# Patient Record
Sex: MALE | Race: BLACK OR AFRICAN AMERICAN | Employment: UNEMPLOYED | URBAN - METROPOLITAN AREA
[De-identification: names, ages, dates, MRNs, and addresses within clinical notes are randomized per-mention and may not be internally consistent; named-entity substitution may affect disease eponyms.]

---

## 2019-09-05 ENCOUNTER — APPOINTMENT (OUTPATIENT)
Dept: ULTRASOUND IMAGING | Age: 24
DRG: 282 | End: 2019-09-05
Payer: COMMERCIAL

## 2019-09-05 ENCOUNTER — APPOINTMENT (OUTPATIENT)
Dept: GENERAL RADIOLOGY | Age: 24
DRG: 282 | End: 2019-09-05
Payer: COMMERCIAL

## 2019-09-05 ENCOUNTER — HOSPITAL ENCOUNTER (INPATIENT)
Age: 24
LOS: 2 days | Discharge: HOME | DRG: 282 | End: 2019-09-07
Attending: EMERGENCY MEDICINE | Admitting: INTERNAL MEDICINE
Payer: COMMERCIAL

## 2019-09-05 DIAGNOSIS — R10.13 EPIGASTRIC PAIN: ICD-10-CM

## 2019-09-05 DIAGNOSIS — K85.90 ACUTE PANCREATITIS, UNSPECIFIED COMPLICATION STATUS, UNSPECIFIED PANCREATITIS TYPE: Primary | ICD-10-CM

## 2019-09-05 PROBLEM — Z72.51 HIGH RISK SEXUAL BEHAVIOR: Status: ACTIVE | Noted: 2019-09-05

## 2019-09-05 PROBLEM — F10.10 ALCOHOL ABUSE: Status: ACTIVE | Noted: 2019-09-05

## 2019-09-05 PROBLEM — K85.20 ALCOHOL-INDUCED ACUTE PANCREATITIS WITHOUT INFECTION OR NECROSIS: Status: ACTIVE | Noted: 2019-09-05

## 2019-09-05 LAB
ALBUMIN SERPL-MCNC: 5 G/DL (ref 3.4–5)
ALP BLD-CCNC: 75 U/L (ref 40–129)
ALT SERPL-CCNC: 21 U/L (ref 10–40)
ANION GAP SERPL CALCULATED.3IONS-SCNC: 21 MMOL/L (ref 3–16)
AST SERPL-CCNC: 35 U/L (ref 15–37)
BASOPHILS ABSOLUTE: 0 K/UL (ref 0–0.2)
BASOPHILS RELATIVE PERCENT: 0.4 %
BILIRUB SERPL-MCNC: 1.2 MG/DL (ref 0–1)
BILIRUBIN DIRECT: 0.3 MG/DL (ref 0–0.3)
BILIRUBIN, INDIRECT: 0.9 MG/DL (ref 0–1)
BUN BLDV-MCNC: 9 MG/DL (ref 7–20)
CALCIUM SERPL-MCNC: 9.9 MG/DL (ref 8.3–10.6)
CHLORIDE BLD-SCNC: 97 MMOL/L (ref 99–110)
CO2: 21 MMOL/L (ref 21–32)
CREAT SERPL-MCNC: 0.8 MG/DL (ref 0.9–1.3)
EKG ATRIAL RATE: 59 BPM
EKG DIAGNOSIS: NORMAL
EKG P-R INTERVAL: 100 MS
EKG Q-T INTERVAL: 458 MS
EKG QRS DURATION: 96 MS
EKG QTC CALCULATION (BAZETT): 453 MS
EKG R AXIS: 75 DEGREES
EKG T AXIS: 39 DEGREES
EKG VENTRICULAR RATE: 59 BPM
EOSINOPHILS ABSOLUTE: 0 K/UL (ref 0–0.6)
EOSINOPHILS RELATIVE PERCENT: 0 %
GFR AFRICAN AMERICAN: >60
GFR NON-AFRICAN AMERICAN: >60
GLUCOSE BLD-MCNC: 132 MG/DL (ref 70–99)
HCT VFR BLD CALC: 42.8 % (ref 40.5–52.5)
HEMOGLOBIN: 14.5 G/DL (ref 13.5–17.5)
LIPASE: 361 U/L (ref 13–60)
LYMPHOCYTES ABSOLUTE: 1.3 K/UL (ref 1–5.1)
LYMPHOCYTES RELATIVE PERCENT: 13 %
MCH RBC QN AUTO: 32.5 PG (ref 26–34)
MCHC RBC AUTO-ENTMCNC: 33.9 G/DL (ref 31–36)
MCV RBC AUTO: 96 FL (ref 80–100)
MONOCYTES ABSOLUTE: 0.7 K/UL (ref 0–1.3)
MONOCYTES RELATIVE PERCENT: 7.5 %
NEUTROPHILS ABSOLUTE: 7.8 K/UL (ref 1.7–7.7)
NEUTROPHILS RELATIVE PERCENT: 79.1 %
PDW BLD-RTO: 14 % (ref 12.4–15.4)
PLATELET # BLD: 350 K/UL (ref 135–450)
PMV BLD AUTO: 9.1 FL (ref 5–10.5)
POTASSIUM REFLEX MAGNESIUM: 3.7 MMOL/L (ref 3.5–5.1)
RBC # BLD: 4.46 M/UL (ref 4.2–5.9)
SODIUM BLD-SCNC: 139 MMOL/L (ref 136–145)
TOTAL PROTEIN: 7.6 G/DL (ref 6.4–8.2)
TRIGL SERPL-MCNC: 92 MG/DL (ref 0–150)
TROPONIN: <0.01 NG/ML
TROPONIN: <0.01 NG/ML
WBC # BLD: 9.8 K/UL (ref 4–11)

## 2019-09-05 PROCEDURE — 2580000003 HC RX 258: Performed by: EMERGENCY MEDICINE

## 2019-09-05 PROCEDURE — 80048 BASIC METABOLIC PNL TOTAL CA: CPT

## 2019-09-05 PROCEDURE — 1200000000 HC SEMI PRIVATE

## 2019-09-05 PROCEDURE — 93005 ELECTROCARDIOGRAM TRACING: CPT | Performed by: EMERGENCY MEDICINE

## 2019-09-05 PROCEDURE — 6360000002 HC RX W HCPCS: Performed by: EMERGENCY MEDICINE

## 2019-09-05 PROCEDURE — 6360000002 HC RX W HCPCS: Performed by: INTERNAL MEDICINE

## 2019-09-05 PROCEDURE — 96361 HYDRATE IV INFUSION ADD-ON: CPT

## 2019-09-05 PROCEDURE — 6370000000 HC RX 637 (ALT 250 FOR IP): Performed by: EMERGENCY MEDICINE

## 2019-09-05 PROCEDURE — 2500000003 HC RX 250 WO HCPCS: Performed by: INTERNAL MEDICINE

## 2019-09-05 PROCEDURE — 83690 ASSAY OF LIPASE: CPT

## 2019-09-05 PROCEDURE — 84478 ASSAY OF TRIGLYCERIDES: CPT

## 2019-09-05 PROCEDURE — 71046 X-RAY EXAM CHEST 2 VIEWS: CPT

## 2019-09-05 PROCEDURE — 84484 ASSAY OF TROPONIN QUANT: CPT

## 2019-09-05 PROCEDURE — 85025 COMPLETE CBC W/AUTO DIFF WBC: CPT

## 2019-09-05 PROCEDURE — 80076 HEPATIC FUNCTION PANEL: CPT

## 2019-09-05 PROCEDURE — 96374 THER/PROPH/DIAG INJ IV PUSH: CPT

## 2019-09-05 PROCEDURE — 96375 TX/PRO/DX INJ NEW DRUG ADDON: CPT

## 2019-09-05 PROCEDURE — 76705 ECHO EXAM OF ABDOMEN: CPT

## 2019-09-05 PROCEDURE — 99285 EMERGENCY DEPT VISIT HI MDM: CPT

## 2019-09-05 PROCEDURE — 2580000003 HC RX 258: Performed by: INTERNAL MEDICINE

## 2019-09-05 RX ORDER — KETOROLAC TROMETHAMINE 30 MG/ML
15 INJECTION, SOLUTION INTRAMUSCULAR; INTRAVENOUS ONCE
Status: COMPLETED | OUTPATIENT
Start: 2019-09-05 | End: 2019-09-05

## 2019-09-05 RX ORDER — METOCLOPRAMIDE HYDROCHLORIDE 5 MG/ML
10 INJECTION INTRAMUSCULAR; INTRAVENOUS ONCE
Status: COMPLETED | OUTPATIENT
Start: 2019-09-05 | End: 2019-09-05

## 2019-09-05 RX ORDER — SODIUM CHLORIDE 0.9 % (FLUSH) 0.9 %
10 SYRINGE (ML) INJECTION EVERY 12 HOURS SCHEDULED
Status: DISCONTINUED | OUTPATIENT
Start: 2019-09-05 | End: 2019-09-05 | Stop reason: SDUPTHER

## 2019-09-05 RX ORDER — HYDROCODONE BITARTRATE AND ACETAMINOPHEN 5; 325 MG/1; MG/1
1 TABLET ORAL ONCE
Status: COMPLETED | OUTPATIENT
Start: 2019-09-05 | End: 2019-09-05

## 2019-09-05 RX ORDER — SODIUM CHLORIDE, SODIUM LACTATE, POTASSIUM CHLORIDE, CALCIUM CHLORIDE 600; 310; 30; 20 MG/100ML; MG/100ML; MG/100ML; MG/100ML
1000 INJECTION, SOLUTION INTRAVENOUS ONCE
Status: COMPLETED | OUTPATIENT
Start: 2019-09-05 | End: 2019-09-05

## 2019-09-05 RX ORDER — LORAZEPAM 1 MG/1
3 TABLET ORAL
Status: DISCONTINUED | OUTPATIENT
Start: 2019-09-05 | End: 2019-09-07 | Stop reason: HOSPADM

## 2019-09-05 RX ORDER — SODIUM CHLORIDE 0.9 % (FLUSH) 0.9 %
10 SYRINGE (ML) INJECTION PRN
Status: DISCONTINUED | OUTPATIENT
Start: 2019-09-05 | End: 2019-09-07 | Stop reason: HOSPADM

## 2019-09-05 RX ORDER — MORPHINE SULFATE 2 MG/ML
4 INJECTION, SOLUTION INTRAMUSCULAR; INTRAVENOUS
Status: DISCONTINUED | OUTPATIENT
Start: 2019-09-05 | End: 2019-09-07 | Stop reason: HOSPADM

## 2019-09-05 RX ORDER — SODIUM CHLORIDE 0.9 % (FLUSH) 0.9 %
10 SYRINGE (ML) INJECTION EVERY 12 HOURS SCHEDULED
Status: DISCONTINUED | OUTPATIENT
Start: 2019-09-05 | End: 2019-09-07 | Stop reason: HOSPADM

## 2019-09-05 RX ORDER — LORAZEPAM 2 MG/ML
1 INJECTION INTRAMUSCULAR
Status: DISCONTINUED | OUTPATIENT
Start: 2019-09-05 | End: 2019-09-07 | Stop reason: HOSPADM

## 2019-09-05 RX ORDER — LORAZEPAM 2 MG/ML
3 INJECTION INTRAMUSCULAR
Status: DISCONTINUED | OUTPATIENT
Start: 2019-09-05 | End: 2019-09-07 | Stop reason: HOSPADM

## 2019-09-05 RX ORDER — ONDANSETRON 2 MG/ML
4 INJECTION INTRAMUSCULAR; INTRAVENOUS EVERY 6 HOURS PRN
Status: DISCONTINUED | OUTPATIENT
Start: 2019-09-05 | End: 2019-09-07 | Stop reason: HOSPADM

## 2019-09-05 RX ORDER — LORAZEPAM 2 MG/ML
4 INJECTION INTRAMUSCULAR
Status: DISCONTINUED | OUTPATIENT
Start: 2019-09-05 | End: 2019-09-07 | Stop reason: HOSPADM

## 2019-09-05 RX ORDER — LORAZEPAM 1 MG/1
1 TABLET ORAL
Status: DISCONTINUED | OUTPATIENT
Start: 2019-09-05 | End: 2019-09-07 | Stop reason: HOSPADM

## 2019-09-05 RX ORDER — LORAZEPAM 1 MG/1
4 TABLET ORAL
Status: DISCONTINUED | OUTPATIENT
Start: 2019-09-05 | End: 2019-09-07 | Stop reason: HOSPADM

## 2019-09-05 RX ORDER — SODIUM CHLORIDE 0.9 % (FLUSH) 0.9 %
10 SYRINGE (ML) INJECTION PRN
Status: DISCONTINUED | OUTPATIENT
Start: 2019-09-05 | End: 2019-09-05 | Stop reason: SDUPTHER

## 2019-09-05 RX ORDER — DEXTROSE AND SODIUM CHLORIDE 5; .9 G/100ML; G/100ML
INJECTION, SOLUTION INTRAVENOUS CONTINUOUS
Status: DISCONTINUED | OUTPATIENT
Start: 2019-09-05 | End: 2019-09-07 | Stop reason: HOSPADM

## 2019-09-05 RX ORDER — ACETAMINOPHEN 325 MG/1
650 TABLET ORAL EVERY 4 HOURS PRN
Status: DISCONTINUED | OUTPATIENT
Start: 2019-09-05 | End: 2019-09-07 | Stop reason: HOSPADM

## 2019-09-05 RX ORDER — MORPHINE SULFATE 2 MG/ML
2 INJECTION, SOLUTION INTRAMUSCULAR; INTRAVENOUS
Status: DISCONTINUED | OUTPATIENT
Start: 2019-09-05 | End: 2019-09-07 | Stop reason: HOSPADM

## 2019-09-05 RX ORDER — ONDANSETRON 2 MG/ML
4 INJECTION INTRAMUSCULAR; INTRAVENOUS ONCE
Status: COMPLETED | OUTPATIENT
Start: 2019-09-05 | End: 2019-09-05

## 2019-09-05 RX ORDER — LORAZEPAM 1 MG/1
2 TABLET ORAL
Status: DISCONTINUED | OUTPATIENT
Start: 2019-09-05 | End: 2019-09-07 | Stop reason: HOSPADM

## 2019-09-05 RX ORDER — MORPHINE SULFATE 4 MG/ML
4 INJECTION, SOLUTION INTRAMUSCULAR; INTRAVENOUS
Status: DISCONTINUED | OUTPATIENT
Start: 2019-09-05 | End: 2019-09-05 | Stop reason: SDUPTHER

## 2019-09-05 RX ORDER — LORAZEPAM 2 MG/ML
2 INJECTION INTRAMUSCULAR
Status: DISCONTINUED | OUTPATIENT
Start: 2019-09-05 | End: 2019-09-07 | Stop reason: HOSPADM

## 2019-09-05 RX ADMIN — Medication 10 ML: at 20:03

## 2019-09-05 RX ADMIN — MORPHINE SULFATE 4 MG: 2 INJECTION, SOLUTION INTRAMUSCULAR; INTRAVENOUS at 22:09

## 2019-09-05 RX ADMIN — DEXTROSE AND SODIUM CHLORIDE: 5; 900 INJECTION, SOLUTION INTRAVENOUS at 20:01

## 2019-09-05 RX ADMIN — SODIUM CHLORIDE, POTASSIUM CHLORIDE, SODIUM LACTATE AND CALCIUM CHLORIDE 1000 ML: 600; 310; 30; 20 INJECTION, SOLUTION INTRAVENOUS at 09:33

## 2019-09-05 RX ADMIN — ONDANSETRON 4 MG: 2 INJECTION INTRAMUSCULAR; INTRAVENOUS at 07:26

## 2019-09-05 RX ADMIN — FOLIC ACID: 5 INJECTION, SOLUTION INTRAMUSCULAR; INTRAVENOUS; SUBCUTANEOUS at 14:19

## 2019-09-05 RX ADMIN — SODIUM CHLORIDE, POTASSIUM CHLORIDE, SODIUM LACTATE AND CALCIUM CHLORIDE 1000 ML: 600; 310; 30; 20 INJECTION, SOLUTION INTRAVENOUS at 07:26

## 2019-09-05 RX ADMIN — DEXTROSE AND SODIUM CHLORIDE: 5; 900 INJECTION, SOLUTION INTRAVENOUS at 12:29

## 2019-09-05 RX ADMIN — HYDROCODONE BITARTRATE AND ACETAMINOPHEN 1 TABLET: 5; 325 TABLET ORAL at 09:10

## 2019-09-05 RX ADMIN — Medication 10 ML: at 12:29

## 2019-09-05 RX ADMIN — MORPHINE SULFATE 4 MG: 4 INJECTION INTRAVENOUS at 09:32

## 2019-09-05 RX ADMIN — KETOROLAC TROMETHAMINE 15 MG: 30 INJECTION, SOLUTION INTRAMUSCULAR at 07:26

## 2019-09-05 RX ADMIN — METOCLOPRAMIDE 10 MG: 5 INJECTION, SOLUTION INTRAMUSCULAR; INTRAVENOUS at 09:10

## 2019-09-05 RX ADMIN — MORPHINE SULFATE 4 MG: 2 INJECTION, SOLUTION INTRAMUSCULAR; INTRAVENOUS at 20:02

## 2019-09-05 RX ADMIN — MORPHINE SULFATE 4 MG: 2 INJECTION, SOLUTION INTRAMUSCULAR; INTRAVENOUS at 17:49

## 2019-09-05 RX ADMIN — MORPHINE SULFATE 4 MG: 2 INJECTION, SOLUTION INTRAMUSCULAR; INTRAVENOUS at 14:24

## 2019-09-05 RX ADMIN — MORPHINE SULFATE 4 MG: 2 INJECTION, SOLUTION INTRAMUSCULAR; INTRAVENOUS at 12:26

## 2019-09-05 SDOH — HEALTH STABILITY: MENTAL HEALTH: HOW OFTEN DO YOU HAVE A DRINK CONTAINING ALCOHOL?: NEVER

## 2019-09-05 ASSESSMENT — PAIN SCALES - GENERAL
PAINLEVEL_OUTOF10: 9
PAINLEVEL_OUTOF10: 10
PAINLEVEL_OUTOF10: 9
PAINLEVEL_OUTOF10: 7
PAINLEVEL_OUTOF10: 9
PAINLEVEL_OUTOF10: 7
PAINLEVEL_OUTOF10: 10
PAINLEVEL_OUTOF10: 8
PAINLEVEL_OUTOF10: 8
PAINLEVEL_OUTOF10: 10

## 2019-09-05 ASSESSMENT — PAIN DESCRIPTION - ORIENTATION
ORIENTATION: RIGHT;OUTER
ORIENTATION: MID

## 2019-09-05 ASSESSMENT — PAIN DESCRIPTION - LOCATION
LOCATION: ABDOMEN;BACK
LOCATION: ABDOMEN
LOCATION: BACK
LOCATION: BACK

## 2019-09-05 ASSESSMENT — PAIN DESCRIPTION - DESCRIPTORS
DESCRIPTORS: ACHING;BURNING;DISCOMFORT
DESCRIPTORS: ACHING;CONSTANT

## 2019-09-05 ASSESSMENT — ENCOUNTER SYMPTOMS
SHORTNESS OF BREATH: 0
BACK PAIN: 1
NAUSEA: 0
ABDOMINAL PAIN: 1

## 2019-09-05 ASSESSMENT — PAIN DESCRIPTION - PAIN TYPE
TYPE: ACUTE PAIN

## 2019-09-05 ASSESSMENT — PAIN DESCRIPTION - FREQUENCY: FREQUENCY: CONTINUOUS

## 2019-09-05 ASSESSMENT — PAIN DESCRIPTION - PROGRESSION: CLINICAL_PROGRESSION: GRADUALLY IMPROVING

## 2019-09-05 ASSESSMENT — PAIN DESCRIPTION - ONSET: ONSET: ON-GOING

## 2019-09-05 NOTE — H&P
not admit to his, however with parents concern and hx of withdrawal - will order banana bag daily and CIWA protocol - will consult social work for resources as well  3.   High risk behavior - pt denies, parent endorse, will try to get HIV test      DVT prophylaxis Yes:  lovenox  GI prophylaxis pepcid  Antibiotic prophylaxis:  No    Code status FULL    Please note that over 50 minutes was spent in evaluating the patient, review of records and results, discussion with staff/family, etc.    Electronically signed by Harvey Patterson MD on 9/5/2019 at 11:54 AM

## 2019-09-05 NOTE — DISCHARGE INSTR - COC
Continuity of Care Form    Patient Name: Lito Mcduffie   :  1995  MRN:  [id number]    Admit date:  2019  Discharge date:  ***    Code Status Order: Full Code   Advance Directives:   Advance Care Flowsheet Documentation     Date/Time Healthcare Directive Type of Healthcare Directive Copy in [address] Agent's Name Healthcare Agent's Phone Number    19 1103  No, patient does not have an advance directive for healthcare treatment -- -- -- -- --          Admitting Physician:  Harvey Patterson MD  PCP: No primary care provider on file. Discharging Nurse: [facility] Unit/Room#: 7080/5152-93  Discharging Unit Phone Number: ***    Emergency Contact:   Extended Emergency Contact Information  Primary Emergency Contact: Bennie amaya  Home Phone: [phone]  Mobile Phone: [phone]  Relation: Parent  Secondary Emergency Contact: [address], [address] [place] [address] Phone: [phone]  Relation: Parent    Past Surgical History:  History reviewed. No pertinent surgical history. Immunization History: There is no immunization history on file for this patient.     Active Problems:  Patient Active Problem List   Diagnosis Code    Alcohol-induced acute pancreatitis without infection or necrosis K85.20    Alcohol abuse F10.10    High risk sexual behavior Z72.51       Isolation/Infection:   Isolation          No Isolation            Nurse Assessment:  Last Vital Signs: BP (!) 149/84   Pulse 78   Temp 97.8 °F (36.6 °C) (Oral)   Resp 16   Ht 6' 2.41\" (1.89 m)   Wt 175 lb (79.4 kg)   SpO2 98%   BMI 22.22 kg/m²     Last documented pain score (0-10 scale): Pain Level: 10  Last Weight:   Wt Readings from Last 1 Encounters:   19 175 lb (79.4 kg)     Mental Status:  {IP PT MENTAL STATUS:12722}    IV Access:  [facility] IV ACCESS:462665651}    Nursing Mobility/ADLs:  Walking   {CHP DME TCTO:904035292}  Transfer  {CHP DME AFDD:233462778}  Bathing  {CHP DME [facility], [address] Carmelo Rojas. (residential, outpatient) - [phone]  Kervin Burgess, 600 [facility] - [phone]  [address], 3[facility] - [phone] (must be [facility] resident)   3531 [facility], 2401 [facility] [phone]     Crisis or Emergency Needs - [phone]- [facility] (9304) available    2929 [facility], [address] [facility]     Inpatient/ Residential Treatment Just for Men:  [address] [facility] Residential Treatment - [phone]  2205 [facility], [address] [facility]  [id number] [facility] ([email or web address]) - [phone]  34 [facility] (Men)     Inpatient/ Residential Treatment Just for Women:  [id number] John ([email or web address]-[email or web address]) - [phone]  [address] [place] [address] (women)  [facility]: Minda Sullivan (pregnant &  treatment) - [phone]  [address] [place] [address] [address], [facility] - [phone] (must be [facility] resident;  services)   3531 [facility], [address] [place] [address] 99 - [phone] (Pregnant and  treatment)   1201 [facility], 2301 [facility],[address]    Outpatient Treatment Services:  Дм[address]рий Chandler and [address] for Men - [phone]   1440 [facility], 195 [facility] - 103 [facility], [facility]. [address]  [facility]- [phone]  [address] [place], 201 [facility] Road  [address] Alcohol and Drug Treatment Program [facility] location) - [phone]  601 [facility], 2301 [facility],[address] (4th floor [place])  [address] Alcohol and Drug Treatment Program [facility] Location) - [phone] [address]oc[address], 36 [facility] for [facility] ([facility]) - [phone]  [address], [address]  [address] [phone]   [address] [place], [address]  3100 [facility] ([email or web address])  - [phone]  214 [facility] 1111 [address] [place] [address], 590 [facility] - [phone]  [address] Tj Ramirez , [address]  49 [facility] [phone]  [address] [place], [address] [phone]   [address], [facility] & HOSPITAL [phone]   [address], 4300 [facility] 26 - [phone]   [facility] 2323 N [facility], 85 [facility]  [address]   [address], 400 [facility] Substance Abuse - [phone]  [address] [place], [address]  [place], [address]  [address] [facility]    Methadone/ [facility] Suboxone - [phone]   576 [facility], [address]  [address] - [phone]   25[address] [facility]  Gateways - [phone]   [address] [place], [address] (use lower level entrance)  [facility] Board - [phone]   178 [facility] 24 [place], [address]201 S [facility] - [phone]   [address]  [address] [place] [address] ([email or web address])  - [phone]  214 [facility] 1111 [address] [place] [address], 590 [facility] - [phone]   [address]      / signature: {Esignature:870258398}    PHYSICIAN SECTION    Prognosis: {Prognosis:2646283907}    Condition at Discharge: [address] [place] [address] Patient Condition:645150659}    Rehab Potential (if transferring to Rehab): {Prognosis:0016313814}    Recommended Labs or Other Treatments After

## 2019-09-06 LAB
AMPHETAMINE SCREEN, URINE: ABNORMAL
AMYLASE: 314 U/L (ref 25–115)
ANION GAP SERPL CALCULATED.3IONS-SCNC: 10 MMOL/L (ref 3–16)
BACTERIA: ABNORMAL /HPF
BARBITURATE SCREEN URINE: ABNORMAL
BENZODIAZEPINE SCREEN, URINE: ABNORMAL
BILIRUBIN URINE: ABNORMAL
BLOOD, URINE: NEGATIVE
BUN BLDV-MCNC: 6 MG/DL (ref 7–20)
CALCIUM SERPL-MCNC: 8.9 MG/DL (ref 8.3–10.6)
CANNABINOID SCREEN URINE: ABNORMAL
CHLORIDE BLD-SCNC: 101 MMOL/L (ref 99–110)
CLARITY: CLEAR
CO2: 26 MMOL/L (ref 21–32)
COCAINE METABOLITE SCREEN URINE: ABNORMAL
COLOR: YELLOW
CREAT SERPL-MCNC: 0.8 MG/DL (ref 0.9–1.3)
EPITHELIAL CELLS, UA: ABNORMAL /HPF
GFR AFRICAN AMERICAN: >60
GFR NON-AFRICAN AMERICAN: >60
GLUCOSE BLD-MCNC: 130 MG/DL (ref 70–99)
GLUCOSE URINE: NEGATIVE MG/DL
KETONES, URINE: 15 MG/DL
LEUKOCYTE ESTERASE, URINE: NEGATIVE
LIPASE: 830 U/L (ref 13–60)
Lab: ABNORMAL
MAGNESIUM: 1.5 MG/DL (ref 1.8–2.4)
METHADONE SCREEN, URINE: ABNORMAL
MICROSCOPIC EXAMINATION: YES
MUCUS: ABNORMAL /LPF
NITRITE, URINE: POSITIVE
OPIATE SCREEN URINE: POSITIVE
OXYCODONE URINE: ABNORMAL
PH UA: 6.5
PH UA: 6.5 (ref 5–8)
PHENCYCLIDINE SCREEN URINE: ABNORMAL
POTASSIUM REFLEX MAGNESIUM: 3.8 MMOL/L (ref 3.5–5.1)
PROPOXYPHENE SCREEN: ABNORMAL
PROTEIN UA: 100 MG/DL
RBC UA: ABNORMAL /HPF (ref 0–2)
SODIUM BLD-SCNC: 137 MMOL/L (ref 136–145)
SPECIFIC GRAVITY UA: >=1.03 (ref 1–1.03)
URINE TYPE: ABNORMAL
UROBILINOGEN, URINE: 0.2 E.U./DL
WBC UA: ABNORMAL /HPF (ref 0–5)

## 2019-09-06 PROCEDURE — 6360000002 HC RX W HCPCS: Performed by: INTERNAL MEDICINE

## 2019-09-06 PROCEDURE — 86702 HIV-2 ANTIBODY: CPT

## 2019-09-06 PROCEDURE — 80048 BASIC METABOLIC PNL TOTAL CA: CPT

## 2019-09-06 PROCEDURE — 83735 ASSAY OF MAGNESIUM: CPT

## 2019-09-06 PROCEDURE — 1200000000 HC SEMI PRIVATE

## 2019-09-06 PROCEDURE — 2500000003 HC RX 250 WO HCPCS: Performed by: INTERNAL MEDICINE

## 2019-09-06 PROCEDURE — 82150 ASSAY OF AMYLASE: CPT

## 2019-09-06 PROCEDURE — 87390 HIV-1 AG IA: CPT

## 2019-09-06 PROCEDURE — 36415 COLL VENOUS BLD VENIPUNCTURE: CPT

## 2019-09-06 PROCEDURE — 83690 ASSAY OF LIPASE: CPT

## 2019-09-06 PROCEDURE — 86701 HIV-1ANTIBODY: CPT

## 2019-09-06 PROCEDURE — 81001 URINALYSIS AUTO W/SCOPE: CPT

## 2019-09-06 PROCEDURE — 80307 DRUG TEST PRSMV CHEM ANLYZR: CPT

## 2019-09-06 PROCEDURE — 2580000003 HC RX 258: Performed by: INTERNAL MEDICINE

## 2019-09-06 RX ORDER — POTASSIUM CHLORIDE 7.45 MG/ML
10 INJECTION INTRAVENOUS PRN
Status: DISCONTINUED | OUTPATIENT
Start: 2019-09-06 | End: 2019-09-07 | Stop reason: HOSPADM

## 2019-09-06 RX ORDER — POTASSIUM CHLORIDE 20 MEQ/1
40 TABLET, EXTENDED RELEASE ORAL PRN
Status: DISCONTINUED | OUTPATIENT
Start: 2019-09-06 | End: 2019-09-07 | Stop reason: HOSPADM

## 2019-09-06 RX ORDER — MAGNESIUM SULFATE IN WATER 40 MG/ML
2 INJECTION, SOLUTION INTRAVENOUS PRN
Status: DISCONTINUED | OUTPATIENT
Start: 2019-09-06 | End: 2019-09-07 | Stop reason: HOSPADM

## 2019-09-06 RX ORDER — MAGNESIUM SULFATE IN WATER 40 MG/ML
4 INJECTION, SOLUTION INTRAVENOUS PRN
Status: DISCONTINUED | OUTPATIENT
Start: 2019-09-06 | End: 2019-09-07 | Stop reason: HOSPADM

## 2019-09-06 RX ADMIN — DEXTROSE AND SODIUM CHLORIDE: 5; 900 INJECTION, SOLUTION INTRAVENOUS at 10:51

## 2019-09-06 RX ADMIN — MAGNESIUM SULFATE HEPTAHYDRATE 2 G: 40 INJECTION, SOLUTION INTRAVENOUS at 10:51

## 2019-09-06 RX ADMIN — MORPHINE SULFATE 4 MG: 2 INJECTION, SOLUTION INTRAMUSCULAR; INTRAVENOUS at 13:22

## 2019-09-06 RX ADMIN — MORPHINE SULFATE 2 MG: 2 INJECTION, SOLUTION INTRAMUSCULAR; INTRAVENOUS at 08:31

## 2019-09-06 RX ADMIN — MORPHINE SULFATE 4 MG: 2 INJECTION, SOLUTION INTRAMUSCULAR; INTRAVENOUS at 10:44

## 2019-09-06 RX ADMIN — FOLIC ACID: 5 INJECTION, SOLUTION INTRAMUSCULAR; INTRAVENOUS; SUBCUTANEOUS at 09:09

## 2019-09-06 RX ADMIN — DEXTROSE AND SODIUM CHLORIDE: 5; 900 INJECTION, SOLUTION INTRAVENOUS at 19:43

## 2019-09-06 RX ADMIN — MORPHINE SULFATE 4 MG: 2 INJECTION, SOLUTION INTRAMUSCULAR; INTRAVENOUS at 21:50

## 2019-09-06 RX ADMIN — Medication 10 ML: at 08:33

## 2019-09-06 RX ADMIN — DEXTROSE AND SODIUM CHLORIDE: 5; 900 INJECTION, SOLUTION INTRAVENOUS at 02:35

## 2019-09-06 RX ADMIN — MORPHINE SULFATE 4 MG: 2 INJECTION, SOLUTION INTRAMUSCULAR; INTRAVENOUS at 01:06

## 2019-09-06 RX ADMIN — ENOXAPARIN SODIUM 40 MG: 40 INJECTION SUBCUTANEOUS at 08:32

## 2019-09-06 RX ADMIN — MORPHINE SULFATE 2 MG: 2 INJECTION, SOLUTION INTRAMUSCULAR; INTRAVENOUS at 05:05

## 2019-09-06 ASSESSMENT — PAIN DESCRIPTION - ORIENTATION
ORIENTATION: RIGHT;UPPER
ORIENTATION: UPPER

## 2019-09-06 ASSESSMENT — PAIN DESCRIPTION - LOCATION
LOCATION: ABDOMEN

## 2019-09-06 ASSESSMENT — PAIN DESCRIPTION - PAIN TYPE
TYPE: ACUTE PAIN

## 2019-09-06 ASSESSMENT — PAIN DESCRIPTION - DIRECTION
RADIATING_TOWARDS: TO BACK
RADIATING_TOWARDS: TO BACK
RADIATING_TOWARDS: BACK

## 2019-09-06 ASSESSMENT — PAIN - FUNCTIONAL ASSESSMENT
PAIN_FUNCTIONAL_ASSESSMENT: ACTIVITIES ARE NOT PREVENTED
PAIN_FUNCTIONAL_ASSESSMENT: ACTIVITIES ARE NOT PREVENTED

## 2019-09-06 ASSESSMENT — PAIN DESCRIPTION - DESCRIPTORS
DESCRIPTORS: ACHING
DESCRIPTORS: ACHING;BURNING;STABBING;THROBBING
DESCRIPTORS: ACHING
DESCRIPTORS: ACHING;BURNING;STABBING;THROBBING
DESCRIPTORS: ACHING

## 2019-09-06 ASSESSMENT — PAIN DESCRIPTION - PROGRESSION
CLINICAL_PROGRESSION: GRADUALLY WORSENING
CLINICAL_PROGRESSION: GRADUALLY IMPROVING
CLINICAL_PROGRESSION: GRADUALLY WORSENING
CLINICAL_PROGRESSION: GRADUALLY IMPROVING

## 2019-09-06 ASSESSMENT — PAIN SCALES - GENERAL
PAINLEVEL_OUTOF10: 7
PAINLEVEL_OUTOF10: 6
PAINLEVEL_OUTOF10: 10
PAINLEVEL_OUTOF10: 8
PAINLEVEL_OUTOF10: 6
PAINLEVEL_OUTOF10: 9

## 2019-09-06 ASSESSMENT — PAIN DESCRIPTION - ONSET
ONSET: ON-GOING

## 2019-09-06 ASSESSMENT — PAIN DESCRIPTION - FREQUENCY
FREQUENCY: CONTINUOUS

## 2019-09-06 NOTE — PROGRESS NOTES
Pt alert and oriented x4, complaining of 10/10 pain to abdomen radiating into his back, medicated with prn Morphine, Magnesium level 1.5 this am, new orders for magnesium replacement given, pt now resting comfortably in bed, call light within reach.

## 2019-09-06 NOTE — PLAN OF CARE
Problem: Pain:  Goal: Control of acute pain  Description  Control of acute pain. Pt medicated per [facility] for pain control.    9/6/2019 1227 by Lam Villela RN  Outcome: Ongoing

## 2019-09-07 VITALS
OXYGEN SATURATION: 98 % | DIASTOLIC BLOOD PRESSURE: 79 MMHG | BODY MASS INDEX: 22.46 KG/M2 | HEIGHT: 74 IN | TEMPERATURE: 98.9 F | WEIGHT: 175 LBS | RESPIRATION RATE: 20 BRPM | SYSTOLIC BLOOD PRESSURE: 134 MMHG | HEART RATE: 76 BPM

## 2019-09-07 PROBLEM — R10.13 EPIGASTRIC PAIN: Status: ACTIVE | Noted: 2019-09-07

## 2019-09-07 LAB
ALBUMIN SERPL-MCNC: 3.5 G/DL (ref 3.4–5)
ALP BLD-CCNC: 55 U/L (ref 40–129)
ALT SERPL-CCNC: 11 U/L (ref 10–40)
AMYLASE: 208 U/L (ref 25–115)
ANION GAP SERPL CALCULATED.3IONS-SCNC: 9 MMOL/L (ref 3–16)
AST SERPL-CCNC: 15 U/L (ref 15–37)
BILIRUB SERPL-MCNC: 0.7 MG/DL (ref 0–1)
BILIRUBIN DIRECT: <0.2 MG/DL (ref 0–0.3)
BILIRUBIN, INDIRECT: ABNORMAL MG/DL (ref 0–1)
BUN BLDV-MCNC: 3 MG/DL (ref 7–20)
CALCIUM SERPL-MCNC: 8.8 MG/DL (ref 8.3–10.6)
CHLORIDE BLD-SCNC: 99 MMOL/L (ref 99–110)
CO2: 29 MMOL/L (ref 21–32)
CREAT SERPL-MCNC: 0.7 MG/DL (ref 0.9–1.3)
GFR AFRICAN AMERICAN: >60
GFR NON-AFRICAN AMERICAN: >60
GLUCOSE BLD-MCNC: 114 MG/DL (ref 70–99)
HCT VFR BLD CALC: 39.6 % (ref 40.5–52.5)
HEMOGLOBIN: 13.2 G/DL (ref 13.5–17.5)
HIV AG/AB: NORMAL
HIV ANTIGEN: NORMAL
HIV-1 ANTIBODY: NORMAL
HIV-2 AB: NORMAL
LIPASE: 379 U/L (ref 13–60)
MAGNESIUM: 2 MG/DL (ref 1.8–2.4)
MCH RBC QN AUTO: 32.4 PG (ref 26–34)
MCHC RBC AUTO-ENTMCNC: 33.2 G/DL (ref 31–36)
MCV RBC AUTO: 97.5 FL (ref 80–100)
PDW BLD-RTO: 13.6 % (ref 12.4–15.4)
PLATELET # BLD: 215 K/UL (ref 135–450)
PMV BLD AUTO: 8.5 FL (ref 5–10.5)
POTASSIUM SERPL-SCNC: 3.4 MMOL/L (ref 3.5–5.1)
RBC # BLD: 4.06 M/UL (ref 4.2–5.9)
SODIUM BLD-SCNC: 137 MMOL/L (ref 136–145)
TOTAL PROTEIN: 6 G/DL (ref 6.4–8.2)
WBC # BLD: 11.6 K/UL (ref 4–11)

## 2019-09-07 PROCEDURE — 36415 COLL VENOUS BLD VENIPUNCTURE: CPT

## 2019-09-07 PROCEDURE — 80048 BASIC METABOLIC PNL TOTAL CA: CPT

## 2019-09-07 PROCEDURE — 6360000002 HC RX W HCPCS: Performed by: INTERNAL MEDICINE

## 2019-09-07 PROCEDURE — 2500000003 HC RX 250 WO HCPCS: Performed by: INTERNAL MEDICINE

## 2019-09-07 PROCEDURE — C9113 INJ PANTOPRAZOLE SODIUM, VIA: HCPCS | Performed by: INTERNAL MEDICINE

## 2019-09-07 PROCEDURE — 83735 ASSAY OF MAGNESIUM: CPT

## 2019-09-07 PROCEDURE — 2580000003 HC RX 258: Performed by: INTERNAL MEDICINE

## 2019-09-07 PROCEDURE — 80076 HEPATIC FUNCTION PANEL: CPT

## 2019-09-07 PROCEDURE — 82150 ASSAY OF AMYLASE: CPT

## 2019-09-07 PROCEDURE — 83690 ASSAY OF LIPASE: CPT

## 2019-09-07 PROCEDURE — 85027 COMPLETE CBC AUTOMATED: CPT

## 2019-09-07 RX ORDER — THIAMINE MONONITRATE (VIT B1) 100 MG
100 TABLET ORAL DAILY
Qty: 30 TABLET | Refills: 3 | Status: SHIPPED | OUTPATIENT
Start: 2019-09-07

## 2019-09-07 RX ORDER — FOLIC ACID 1 MG/1
1 TABLET ORAL DAILY
Qty: 30 TABLET | Refills: 1 | Status: SHIPPED | OUTPATIENT
Start: 2019-09-07

## 2019-09-07 RX ORDER — PANTOPRAZOLE SODIUM 40 MG/1
40 TABLET, DELAYED RELEASE ORAL
Qty: 30 TABLET | Refills: 1 | Status: SHIPPED | OUTPATIENT
Start: 2019-09-07

## 2019-09-07 RX ORDER — PANTOPRAZOLE SODIUM 40 MG/10ML
40 INJECTION, POWDER, LYOPHILIZED, FOR SOLUTION INTRAVENOUS DAILY
Status: DISCONTINUED | OUTPATIENT
Start: 2019-09-07 | End: 2019-09-07 | Stop reason: HOSPADM

## 2019-09-07 RX ORDER — HYDROCODONE BITARTRATE AND ACETAMINOPHEN 5; 325 MG/1; MG/1
1 TABLET ORAL EVERY 6 HOURS PRN
Qty: 10 TABLET | Refills: 0 | Status: SHIPPED | OUTPATIENT
Start: 2019-09-07 | End: 2019-09-10

## 2019-09-07 RX ADMIN — DEXTROSE AND SODIUM CHLORIDE: 5; 900 INJECTION, SOLUTION INTRAVENOUS at 02:13

## 2019-09-07 RX ADMIN — FOLIC ACID: 5 INJECTION, SOLUTION INTRAMUSCULAR; INTRAVENOUS; SUBCUTANEOUS at 10:11

## 2019-09-07 RX ADMIN — MORPHINE SULFATE 4 MG: 2 INJECTION, SOLUTION INTRAMUSCULAR; INTRAVENOUS at 05:15

## 2019-09-07 RX ADMIN — MORPHINE SULFATE 4 MG: 2 INJECTION, SOLUTION INTRAMUSCULAR; INTRAVENOUS at 09:04

## 2019-09-07 RX ADMIN — MORPHINE SULFATE 2 MG: 2 INJECTION, SOLUTION INTRAMUSCULAR; INTRAVENOUS at 00:24

## 2019-09-07 RX ADMIN — PANTOPRAZOLE SODIUM 40 MG: 40 INJECTION, POWDER, LYOPHILIZED, FOR SOLUTION INTRAVENOUS at 09:07

## 2019-09-07 RX ADMIN — DEXTROSE AND SODIUM CHLORIDE: 5; 900 INJECTION, SOLUTION INTRAVENOUS at 09:07

## 2019-09-07 ASSESSMENT — PAIN DESCRIPTION - DIRECTION: RADIATING_TOWARDS: BACK

## 2019-09-07 ASSESSMENT — PAIN SCALES - GENERAL
PAINLEVEL_OUTOF10: 9
PAINLEVEL_OUTOF10: 5
PAINLEVEL_OUTOF10: 8
PAINLEVEL_OUTOF10: 6
PAINLEVEL_OUTOF10: 6

## 2019-09-07 ASSESSMENT — PAIN DESCRIPTION - PAIN TYPE
TYPE: ACUTE PAIN
TYPE: ACUTE PAIN

## 2019-09-07 ASSESSMENT — PAIN DESCRIPTION - DESCRIPTORS
DESCRIPTORS: ACHING
DESCRIPTORS: ACHING;SHARP

## 2019-09-07 ASSESSMENT — PAIN DESCRIPTION - ONSET
ONSET: ON-GOING
ONSET: ON-GOING

## 2019-09-07 ASSESSMENT — PAIN DESCRIPTION - ORIENTATION: ORIENTATION: UPPER

## 2019-09-07 ASSESSMENT — PAIN DESCRIPTION - PROGRESSION
CLINICAL_PROGRESSION: GRADUALLY IMPROVING
CLINICAL_PROGRESSION: GRADUALLY IMPROVING

## 2019-09-07 ASSESSMENT — PAIN DESCRIPTION - LOCATION
LOCATION: ABDOMEN
LOCATION: ABDOMEN;BACK

## 2019-09-07 ASSESSMENT — PAIN DESCRIPTION - FREQUENCY
FREQUENCY: CONTINUOUS
FREQUENCY: CONTINUOUS

## 2019-09-07 NOTE — PROGRESS NOTES
Discharge instructions reviewed with patient. Patient education on maintaining a low-fat diet and alcohol cessation to prevent pancreatitis flare-ups. New medications and side effects reviewed with patient. Patient educated on need for follow-up appointment with a PCP and how to establish PCP. Patient instructed to contact [address] [place] [address] to establish a PCP. Peripheral IV removed from RAC, no complications noted. Patient verbalizes understanding of discharge instruction, all questions answered.

## 2019-09-07 NOTE — DISCHARGE SUMMARY
Hospital Medicine Discharge Summary    Patient ID: Jonny Guido      Patient's PCP: No primary care provider on file. Admit Date: 9/5/2019     Discharge Date:   09/07/19     Admitting Physician: Heidi Ferrer MD     Discharge Physician: Rebeca Vasquez MD     Discharge Diagnoses: Active Hospital Problems    Diagnosis    Epigastric pain [R10.13]    Alcohol-induced acute pancreatitis without infection or necrosis [K85.20]    Alcohol abuse [F10.10]    High risk sexual behavior [Z72.51]       The patient was seen and examined on day of discharge and this discharge summary is in conjunction with any daily progress note from day of discharge. Hospital Course:       Admitted with acute alcoholic pancreatitis  Showed quick clinical improvement  Was able to eat low fat regular consistency diet on the day of discharge. Counseled regarding alcohol cessation. Explained next attack of alcoholic pancreatitis is likely to be more severe and painful than this one  [place] review did not show high-risk behavior. In fact, patient had no controlled substance prescription in the database  Will be discharged home with medications as reported below  PCP list provided at the time of discharge        Physical Exam Performed:     /79   Pulse 76   Temp 98.9 °F (37.2 °C) (Oral)   Resp 20   Ht 6' 2.41\" (1.89 m)   Wt 175 lb (79.4 kg)   SpO2 98%   BMI 22.22 kg/m²       General appearance:  No apparent distress, appears stated age and cooperative. HEENT:  Normal cephalic, atraumatic without obvious deformity. Pupils equal, round, and reactive to light. Extra ocular muscles intact. Conjunctivae/corneas clear. Neck: Supple, with full range of motion. No jugular venous distention. Trachea midline. Respiratory:  Normal respiratory effort. Clear to auscultation, bilaterally without Rales/Wheezes/Rhonchi.   Cardiovascular:  Regular rate and rhythm with normal S1/S2 without murmurs, rubs or

## 2019-09-07 NOTE — CARE COORDINATION
/24  Current OT AM-PAC Score:   /24      DISCHARGE Disposition: Home- No Services Needed      Additional CM Notes: ANDREINA Gibbons and his family were provided with choice of provider; he and his family are in agreement with the discharge plan.     Care Transitions patient: Kanwal Ch RN  [facility]  Case Management Department  Ph: [phone]  Fax: [phone]

## 2019-09-23 ENCOUNTER — OFFICE VISIT (OUTPATIENT)
Dept: INTERNAL MEDICINE CLINIC | Age: 24
End: 2019-09-23
Payer: COMMERCIAL

## 2019-09-23 VITALS
HEIGHT: 75 IN | BODY MASS INDEX: 22.22 KG/M2 | DIASTOLIC BLOOD PRESSURE: 79 MMHG | RESPIRATION RATE: 20 BRPM | SYSTOLIC BLOOD PRESSURE: 125 MMHG | TEMPERATURE: 98.1 F | OXYGEN SATURATION: 100 % | WEIGHT: 178.7 LBS | HEART RATE: 79 BPM

## 2019-09-23 DIAGNOSIS — Z87.19 H/O CHRONIC PANCREATITIS: ICD-10-CM

## 2019-09-23 DIAGNOSIS — D64.9 ANEMIA, UNSPECIFIED TYPE: Primary | ICD-10-CM

## 2019-09-23 PROCEDURE — 99213 OFFICE O/P EST LOW 20 MIN: CPT | Performed by: STUDENT IN AN ORGANIZED HEALTH CARE EDUCATION/TRAINING PROGRAM

## 2019-09-23 NOTE — PROGRESS NOTES
Department of Internal Medicine  Resident Clinic New Patient, Hospital follow-up Visit  SUBJECTIVE    HISTORY OF PRESENT ILLNESS:   Angelita Finney is a 30 y.o. male who presents for   Chief Complaint   Patient presents with    Follow-Up from Hospital     pancreatitis       No fever, abd pain. Tolerating po, and meds. No past medical history on file. No past surgical history on file. No family history on file. Social History     Socioeconomic History    Marital status: Single     Spouse name: None    Number of children: None    Years of education: None    Highest education level: None   Occupational History    None   Social Needs    Financial resource strain: None    Food insecurity:     Worry: None     Inability: None    Transportation needs:     Medical: None     Non-medical: None   Tobacco Use    Smoking status: Never Smoker    Smokeless tobacco: Never Used   Substance and Sexual Activity    Alcohol use: Yes     Frequency: Never     Comment: on mondays 4 drinks    Drug use: Never    Sexual activity: None   Lifestyle    Physical activity:     Days per week: None     Minutes per session: None    Stress: None   Relationships    Social connections:     Talks on phone: None     Gets together: None     Attends Yazdanism service: None     Active member of club or organization: None     Attends meetings of clubs or organizations: None     Relationship status: None    Intimate partner violence:     Fear of current or ex partner: None     Emotionally abused: None     Physically abused: None     Forced sexual activity: None   Other Topics Concern    None   Social History Narrative    None       Current Medications:    Prior to Admission medications    Medication Sig Start Date End Date Taking?  Authorizing Provider   pantoprazole (PROTONIX) 40 MG tablet Take 1 tablet by mouth every morning (before breakfast) 9/7/19  Yes Julio Townsend MD   vitamin B-1 (THIAMINE) 100 MG tablet Take 1 tablet

## 2019-09-27 PROBLEM — R10.13 EPIGASTRIC PAIN: Status: RESOLVED | Noted: 2019-09-07 | Resolved: 2019-09-27

## 2019-09-27 ASSESSMENT — ENCOUNTER SYMPTOMS
GASTROINTESTINAL NEGATIVE: 1
RESPIRATORY NEGATIVE: 1
EYES NEGATIVE: 1
ALLERGIC/IMMUNOLOGIC NEGATIVE: 1

## 2020-09-30 ENCOUNTER — APPOINTMENT (OUTPATIENT)
Dept: GENERAL RADIOLOGY | Age: 25
DRG: 282 | End: 2020-09-30
Payer: MEDICAID

## 2020-09-30 ENCOUNTER — APPOINTMENT (OUTPATIENT)
Dept: CT IMAGING | Age: 25
DRG: 282 | End: 2020-09-30
Payer: MEDICAID

## 2020-09-30 ENCOUNTER — HOSPITAL ENCOUNTER (INPATIENT)
Age: 25
LOS: 2 days | Discharge: HOME | DRG: 282 | End: 2020-10-02
Attending: EMERGENCY MEDICINE | Admitting: INTERNAL MEDICINE
Payer: MEDICAID

## 2020-09-30 PROBLEM — K85.90 ACUTE PANCREATITIS: Status: ACTIVE | Noted: 2020-09-30

## 2020-09-30 LAB
A/G RATIO: 1.5 (ref 1.1–2.2)
ALBUMIN SERPL-MCNC: 4.8 G/DL (ref 3.4–5)
ALP BLD-CCNC: 67 U/L (ref 40–129)
ALT SERPL-CCNC: 13 U/L (ref 10–40)
AMPHETAMINE SCREEN, URINE: ABNORMAL
ANION GAP SERPL CALCULATED.3IONS-SCNC: 18 MMOL/L (ref 3–16)
AST SERPL-CCNC: 15 U/L (ref 15–37)
BARBITURATE SCREEN URINE: ABNORMAL
BASOPHILS ABSOLUTE: 0.1 K/UL (ref 0–0.2)
BASOPHILS RELATIVE PERCENT: 0.4 %
BENZODIAZEPINE SCREEN, URINE: ABNORMAL
BILIRUB SERPL-MCNC: 0.8 MG/DL (ref 0–1)
BUN BLDV-MCNC: 11 MG/DL (ref 7–20)
CALCIUM SERPL-MCNC: 10 MG/DL (ref 8.3–10.6)
CANNABINOID SCREEN URINE: POSITIVE
CHLORIDE BLD-SCNC: 95 MMOL/L (ref 99–110)
CO2: 21 MMOL/L (ref 21–32)
COCAINE METABOLITE SCREEN URINE: ABNORMAL
CREAT SERPL-MCNC: 0.8 MG/DL (ref 0.9–1.3)
EKG ATRIAL RATE: 77 BPM
EKG DIAGNOSIS: NORMAL
EKG P AXIS: 86 DEGREES
EKG P-R INTERVAL: 140 MS
EKG Q-T INTERVAL: 400 MS
EKG QRS DURATION: 94 MS
EKG QTC CALCULATION (BAZETT): 452 MS
EKG R AXIS: 75 DEGREES
EKG T AXIS: 61 DEGREES
EKG VENTRICULAR RATE: 77 BPM
EOSINOPHILS ABSOLUTE: 0 K/UL (ref 0–0.6)
EOSINOPHILS RELATIVE PERCENT: 0 %
GFR AFRICAN AMERICAN: >60
GFR NON-AFRICAN AMERICAN: >60
GLOBULIN: 3.2 G/DL
GLUCOSE BLD-MCNC: 117 MG/DL (ref 70–99)
HCT VFR BLD CALC: 45.6 % (ref 40.5–52.5)
HEMOGLOBIN: 15.3 G/DL (ref 13.5–17.5)
LIPASE: 518 U/L (ref 13–60)
LYMPHOCYTES ABSOLUTE: 0.9 K/UL (ref 1–5.1)
LYMPHOCYTES RELATIVE PERCENT: 6.3 %
Lab: ABNORMAL
MAGNESIUM: 2.3 MG/DL (ref 1.8–2.4)
MCH RBC QN AUTO: 31.5 PG (ref 26–34)
MCHC RBC AUTO-ENTMCNC: 33.6 G/DL (ref 31–36)
MCV RBC AUTO: 94 FL (ref 80–100)
METHADONE SCREEN, URINE: ABNORMAL
MONOCYTES ABSOLUTE: 1 K/UL (ref 0–1.3)
MONOCYTES RELATIVE PERCENT: 6.6 %
NEUTROPHILS ABSOLUTE: 12.7 K/UL (ref 1.7–7.7)
NEUTROPHILS RELATIVE PERCENT: 86.7 %
OPIATE SCREEN URINE: POSITIVE
OXYCODONE URINE: ABNORMAL
PDW BLD-RTO: 13.6 % (ref 12.4–15.4)
PH UA: 6
PHENCYCLIDINE SCREEN URINE: ABNORMAL
PLATELET # BLD: 309 K/UL (ref 135–450)
PMV BLD AUTO: 9.3 FL (ref 5–10.5)
POTASSIUM REFLEX MAGNESIUM: 3.7 MMOL/L (ref 3.5–5.1)
PROPOXYPHENE SCREEN: ABNORMAL
RBC # BLD: 4.85 M/UL (ref 4.2–5.9)
SODIUM BLD-SCNC: 134 MMOL/L (ref 136–145)
TOTAL PROTEIN: 8 G/DL (ref 6.4–8.2)
TRIGL SERPL-MCNC: 88 MG/DL (ref 0–150)
WBC # BLD: 14.7 K/UL (ref 4–11)

## 2020-09-30 PROCEDURE — C9113 INJ PANTOPRAZOLE SODIUM, VIA: HCPCS | Performed by: COUNSELOR

## 2020-09-30 PROCEDURE — 85025 COMPLETE CBC W/AUTO DIFF WBC: CPT

## 2020-09-30 PROCEDURE — 6360000002 HC RX W HCPCS: Performed by: INTERNAL MEDICINE

## 2020-09-30 PROCEDURE — 99285 EMERGENCY DEPT VISIT HI MDM: CPT

## 2020-09-30 PROCEDURE — 6370000000 HC RX 637 (ALT 250 FOR IP): Performed by: STUDENT IN AN ORGANIZED HEALTH CARE EDUCATION/TRAINING PROGRAM

## 2020-09-30 PROCEDURE — 6360000002 HC RX W HCPCS: Performed by: COUNSELOR

## 2020-09-30 PROCEDURE — 6360000004 HC RX CONTRAST MEDICATION: Performed by: STUDENT IN AN ORGANIZED HEALTH CARE EDUCATION/TRAINING PROGRAM

## 2020-09-30 PROCEDURE — 2580000003 HC RX 258: Performed by: STUDENT IN AN ORGANIZED HEALTH CARE EDUCATION/TRAINING PROGRAM

## 2020-09-30 PROCEDURE — 83690 ASSAY OF LIPASE: CPT

## 2020-09-30 PROCEDURE — 84478 ASSAY OF TRIGLYCERIDES: CPT

## 2020-09-30 PROCEDURE — 36415 COLL VENOUS BLD VENIPUNCTURE: CPT

## 2020-09-30 PROCEDURE — 83735 ASSAY OF MAGNESIUM: CPT

## 2020-09-30 PROCEDURE — 96374 THER/PROPH/DIAG INJ IV PUSH: CPT

## 2020-09-30 PROCEDURE — 80307 DRUG TEST PRSMV CHEM ANLYZR: CPT

## 2020-09-30 PROCEDURE — 74177 CT ABD & PELVIS W/CONTRAST: CPT

## 2020-09-30 PROCEDURE — 6360000002 HC RX W HCPCS: Performed by: STUDENT IN AN ORGANIZED HEALTH CARE EDUCATION/TRAINING PROGRAM

## 2020-09-30 PROCEDURE — 80053 COMPREHEN METABOLIC PANEL: CPT

## 2020-09-30 PROCEDURE — 71046 X-RAY EXAM CHEST 2 VIEWS: CPT

## 2020-09-30 PROCEDURE — 93005 ELECTROCARDIOGRAM TRACING: CPT | Performed by: STUDENT IN AN ORGANIZED HEALTH CARE EDUCATION/TRAINING PROGRAM

## 2020-09-30 PROCEDURE — 1200000000 HC SEMI PRIVATE

## 2020-09-30 PROCEDURE — 96375 TX/PRO/DX INJ NEW DRUG ADDON: CPT

## 2020-09-30 RX ORDER — SODIUM CHLORIDE 0.9 % (FLUSH) 0.9 %
10 SYRINGE (ML) INJECTION PRN
Status: DISCONTINUED | OUTPATIENT
Start: 2020-09-30 | End: 2020-10-02 | Stop reason: HOSPADM

## 2020-09-30 RX ORDER — ONDANSETRON 2 MG/ML
4 INJECTION INTRAMUSCULAR; INTRAVENOUS ONCE
Status: COMPLETED | OUTPATIENT
Start: 2020-09-30 | End: 2020-09-30

## 2020-09-30 RX ORDER — MORPHINE SULFATE 2 MG/ML
2 INJECTION, SOLUTION INTRAMUSCULAR; INTRAVENOUS ONCE
Status: COMPLETED | OUTPATIENT
Start: 2020-09-30 | End: 2020-09-30

## 2020-09-30 RX ORDER — PROMETHAZINE HYDROCHLORIDE 25 MG/1
12.5 TABLET ORAL EVERY 6 HOURS PRN
Status: DISCONTINUED | OUTPATIENT
Start: 2020-09-30 | End: 2020-10-02 | Stop reason: HOSPADM

## 2020-09-30 RX ORDER — POTASSIUM CHLORIDE 7.45 MG/ML
10 INJECTION INTRAVENOUS
Status: COMPLETED | OUTPATIENT
Start: 2020-09-30 | End: 2020-09-30

## 2020-09-30 RX ORDER — MORPHINE SULFATE 2 MG/ML
2 INJECTION, SOLUTION INTRAMUSCULAR; INTRAVENOUS EVERY 4 HOURS PRN
Status: DISCONTINUED | OUTPATIENT
Start: 2020-09-30 | End: 2020-09-30

## 2020-09-30 RX ORDER — SODIUM CHLORIDE, SODIUM LACTATE, POTASSIUM CHLORIDE, CALCIUM CHLORIDE 600; 310; 30; 20 MG/100ML; MG/100ML; MG/100ML; MG/100ML
INJECTION, SOLUTION INTRAVENOUS CONTINUOUS
Status: DISCONTINUED | OUTPATIENT
Start: 2020-09-30 | End: 2020-10-02

## 2020-09-30 RX ORDER — SODIUM CHLORIDE 0.9 % (FLUSH) 0.9 %
10 SYRINGE (ML) INJECTION EVERY 12 HOURS SCHEDULED
Status: DISCONTINUED | OUTPATIENT
Start: 2020-09-30 | End: 2020-10-02 | Stop reason: HOSPADM

## 2020-09-30 RX ORDER — ACETAMINOPHEN 650 MG/1
650 SUPPOSITORY RECTAL EVERY 6 HOURS PRN
Status: DISCONTINUED | OUTPATIENT
Start: 2020-09-30 | End: 2020-10-02 | Stop reason: HOSPADM

## 2020-09-30 RX ORDER — SODIUM CHLORIDE, SODIUM LACTATE, POTASSIUM CHLORIDE, AND CALCIUM CHLORIDE .6; .31; .03; .02 G/100ML; G/100ML; G/100ML; G/100ML
1000 INJECTION, SOLUTION INTRAVENOUS ONCE
Status: COMPLETED | OUTPATIENT
Start: 2020-09-30 | End: 2020-09-30

## 2020-09-30 RX ORDER — PANTOPRAZOLE SODIUM 40 MG/10ML
40 INJECTION, POWDER, LYOPHILIZED, FOR SOLUTION INTRAVENOUS DAILY
Status: DISCONTINUED | OUTPATIENT
Start: 2020-09-30 | End: 2020-10-02

## 2020-09-30 RX ORDER — ACETAMINOPHEN 325 MG/1
650 TABLET ORAL EVERY 6 HOURS PRN
Status: DISCONTINUED | OUTPATIENT
Start: 2020-09-30 | End: 2020-10-02 | Stop reason: HOSPADM

## 2020-09-30 RX ORDER — SODIUM CHLORIDE, SODIUM LACTATE, POTASSIUM CHLORIDE, CALCIUM CHLORIDE 600; 310; 30; 20 MG/100ML; MG/100ML; MG/100ML; MG/100ML
INJECTION, SOLUTION INTRAVENOUS CONTINUOUS
Status: ACTIVE | OUTPATIENT
Start: 2020-09-30 | End: 2020-09-30

## 2020-09-30 RX ORDER — POLYETHYLENE GLYCOL 3350 17 G/17G
17 POWDER, FOR SOLUTION ORAL DAILY PRN
Status: DISCONTINUED | OUTPATIENT
Start: 2020-09-30 | End: 2020-10-02

## 2020-09-30 RX ORDER — ONDANSETRON 2 MG/ML
4 INJECTION INTRAMUSCULAR; INTRAVENOUS EVERY 6 HOURS PRN
Status: DISCONTINUED | OUTPATIENT
Start: 2020-09-30 | End: 2020-10-02 | Stop reason: HOSPADM

## 2020-09-30 RX ADMIN — MORPHINE SULFATE 2 MG: 2 INJECTION, SOLUTION INTRAMUSCULAR; INTRAVENOUS at 13:58

## 2020-09-30 RX ADMIN — SODIUM CHLORIDE, POTASSIUM CHLORIDE, SODIUM LACTATE AND CALCIUM CHLORIDE: 600; 310; 30; 20 INJECTION, SOLUTION INTRAVENOUS at 17:26

## 2020-09-30 RX ADMIN — POTASSIUM CHLORIDE 10 MEQ: 10 INJECTION, SOLUTION INTRAVENOUS at 18:47

## 2020-09-30 RX ADMIN — ONDANSETRON 4 MG: 2 INJECTION INTRAMUSCULAR; INTRAVENOUS at 13:58

## 2020-09-30 RX ADMIN — IOPAMIDOL 80 ML: 755 INJECTION, SOLUTION INTRAVENOUS at 14:47

## 2020-09-30 RX ADMIN — SODIUM CHLORIDE, POTASSIUM CHLORIDE, SODIUM LACTATE AND CALCIUM CHLORIDE: 600; 310; 30; 20 INJECTION, SOLUTION INTRAVENOUS at 22:52

## 2020-09-30 RX ADMIN — PANTOPRAZOLE SODIUM 40 MG: 40 INJECTION, POWDER, FOR SOLUTION INTRAVENOUS at 17:26

## 2020-09-30 RX ADMIN — POTASSIUM CHLORIDE 10 MEQ: 10 INJECTION, SOLUTION INTRAVENOUS at 21:48

## 2020-09-30 RX ADMIN — Medication 10 ML: at 21:39

## 2020-09-30 RX ADMIN — ACETAMINOPHEN 650 MG: 325 TABLET ORAL at 19:02

## 2020-09-30 RX ADMIN — HYDROMORPHONE HYDROCHLORIDE 1 MG: 1 INJECTION, SOLUTION INTRAMUSCULAR; INTRAVENOUS; SUBCUTANEOUS at 16:28

## 2020-09-30 RX ADMIN — HYDROMORPHONE HYDROCHLORIDE 0.5 MG: 1 INJECTION, SOLUTION INTRAMUSCULAR; INTRAVENOUS; SUBCUTANEOUS at 21:36

## 2020-09-30 RX ADMIN — HYDROMORPHONE HYDROCHLORIDE 1 MG: 1 INJECTION, SOLUTION INTRAMUSCULAR; INTRAVENOUS; SUBCUTANEOUS at 15:23

## 2020-09-30 RX ADMIN — SODIUM CHLORIDE, POTASSIUM CHLORIDE, SODIUM LACTATE AND CALCIUM CHLORIDE 1000 ML: 600; 310; 30; 20 INJECTION, SOLUTION INTRAVENOUS at 13:58

## 2020-09-30 ASSESSMENT — ENCOUNTER SYMPTOMS
COUGH: 0
TROUBLE SWALLOWING: 0
CONSTIPATION: 0
EYES NEGATIVE: 1
SHORTNESS OF BREATH: 1
BLOOD IN STOOL: 0
BACK PAIN: 1
SHORTNESS OF BREATH: 0
ABDOMINAL DISTENTION: 0
NAUSEA: 1
CHEST TIGHTNESS: 0
DIARRHEA: 0
WHEEZING: 0
ABDOMINAL PAIN: 1
VOMITING: 1

## 2020-09-30 ASSESSMENT — PAIN DESCRIPTION - ONSET: ONSET: ON-GOING

## 2020-09-30 ASSESSMENT — PAIN DESCRIPTION - PAIN TYPE
TYPE: ACUTE PAIN

## 2020-09-30 ASSESSMENT — PAIN DESCRIPTION - PROGRESSION: CLINICAL_PROGRESSION: NOT CHANGED

## 2020-09-30 ASSESSMENT — PAIN - FUNCTIONAL ASSESSMENT: PAIN_FUNCTIONAL_ASSESSMENT: PREVENTS OR INTERFERES SOME ACTIVE ACTIVITIES AND ADLS

## 2020-09-30 ASSESSMENT — PAIN DESCRIPTION - LOCATION
LOCATION: ABDOMEN
LOCATION: ABDOMEN;CHEST
LOCATION: ABDOMEN

## 2020-09-30 ASSESSMENT — PAIN SCALES - GENERAL
PAINLEVEL_OUTOF10: 9
PAINLEVEL_OUTOF10: 9
PAINLEVEL_OUTOF10: 10
PAINLEVEL_OUTOF10: 9
PAINLEVEL_OUTOF10: 9

## 2020-09-30 ASSESSMENT — PAIN DESCRIPTION - FREQUENCY: FREQUENCY: CONTINUOUS

## 2020-09-30 ASSESSMENT — PAIN DESCRIPTION - DESCRIPTORS: DESCRIPTORS: SHARP

## 2020-09-30 ASSESSMENT — PAIN DESCRIPTION - ORIENTATION: ORIENTATION: RIGHT;LEFT;MID

## 2020-09-30 NOTE — H&P
Internal Medicine  PGY 1  History & Physical      CC   Chief Complaint   Patient presents with    Chest Pain    Abdominal Pain    Back Pain    Emesis         History Obtained From:  patient    HISTORY OF PRESENT ILLNESS:   Mr. Reyes Quan is a 30 y.o. man w PMHx of alcohol abuse and alcoholic pancreatitis who presents w 2-3 days of progressive abdominal and chest pain, n/v, and back pain which began after he drank about 6 to 7 drinks. He says this pain all started a couple days ago when he drank about 6-7 drinks. He normally drinks 6-7 drinks per week. He says this feels worse than his prior episode of alcoholic pancreatitis about a year ago. He has not been able to tolerate anything PO today. He vomited this AM and last night; denies any blood in emesis. Denies fevers, diarrhea, recent illness or sick contacts. ED workup significant for elevated lipase, leukocytosis, and CT Abdomen c/w acute pancreatitis. Past Medical History:        Diagnosis Date    Pancreatitis    ·     Past Surgical History:    · History reviewed. No pertinent surgical history. Medications Priorto Admission:    · Not in a hospital admission. Allergies:  Patient has no known allergies. Social History:   · TOBACCO:   reports that he has never smoked. He has never used smokeless tobacco.  · ETOH:   reports current alcohol use. · DRUGS : marijuana use    Family History:   · History reviewed. No pertinent family history. Denied family history of liver or pancreatic disease    Review of Systems   Constitutional: Negative for fever. HENT: Negative for congestion, mouth sores and trouble swallowing. Eyes: Negative for visual disturbance. Respiratory: Negative for cough, shortness of breath and wheezing. Cardiovascular: Positive for chest pain. Negative for palpitations and leg swelling. Gastrointestinal: Positive for abdominal pain, nausea and vomiting. Negative for abdominal distention, blood in stool and diarrhea. Endocrine: Negative for polyphagia and polyuria. Genitourinary: Negative for dysuria. Musculoskeletal: Positive for back pain. Negative for gait problem, neck pain and neck stiffness. Skin: Negative for rash and wound. Allergic/Immunologic: Negative for immunocompromised state. Neurological: Negative for weakness, numbness and headaches. ROS: A 10 point review of systems was conducted, significant findings as noted in HPI. Physical Exam  Constitutional:       Appearance: Normal appearance. Comments: Laying still in bed   HENT:      Head: Normocephalic and atraumatic. Mouth/Throat:      Mouth: Mucous membranes are dry. Pharynx: Posterior oropharyngeal erythema present. Eyes:      Conjunctiva/sclera: Conjunctivae normal.   Cardiovascular:      Rate and Rhythm: Normal rate. Rhythm irregular. Heart sounds: No murmur. No friction rub. No gallop. Pulmonary:      Effort: Pulmonary effort is normal.      Breath sounds: Normal breath sounds. Abdominal:      General: Bowel sounds are normal. There is no distension. Comments: Moderate to severe TTP in generalized abdomen most pronounced in epigastric region   Skin:     General: Skin is warm and dry. Neurological:      General: No focal deficit present. Mental Status: He is alert and oriented to person, place, and time. Physical exam:       Vitals:    09/30/20 1525   BP: (!) 153/99   Pulse: 84   Resp: 16   Temp:    SpO2: 100%       DATA:    Labs:  CBC:   Recent Labs     09/30/20  1338   WBC 14.7*   HGB 15.3   HCT 45.6          BMP:   Recent Labs     09/30/20  1338   *   K 3.7   CL 95*   CO2 21   BUN 11   CREATININE 0.8*   GLUCOSE 117*     LFT's:   Recent Labs     09/30/20  1338   AST 15   ALT 13   BILITOT 0.8   ALKPHOS 67     Troponin: No results for input(s): TROPONINI in the last 72 hours. BNP:No results for input(s): BNP in the last 72 hours.   ABGs: No results for input(s): PHART, ETF2ZKO, PO2ART

## 2020-09-30 NOTE — ED PROVIDER NOTES
1 [facility]  EMERGENCY DEPARTMENT ENCOUNTER          [facility] RESIDENT NOTE       Date of evaluation: 9/30/2020    Chief Complaint     Chest Pain; Abdominal Pain; Back Pain; and Emesis      History of Present Illness     eGne Buckner is a 30 y.o. male with past medical history of alcohol abuse and alcoholic pancreatitis who presents with abdominal pain, chest pain, back pain, nausea and vomiting. He says this pain all started a couple days ago when he drank about 6-7 drinks. He normally drinks 6-7 drinks per week. He says this feels worse than his prior episode of alcoholic pancreatitis about a year ago. Over the past couple days his pain has gotten worse and now he is not able to tolerate anything p.o. since yesterday night. He has felt nauseous and has vomited several times today. His pain is most worse in his epigastric region. He rates his pain as 20 out of 10 and the worst pain he is ever felt in his life. He is only tried taking Tylenol with little relief. His pain is aggravated by moving around and even sitting up. He denies any radiation of his pain, but says his pain is present simultaneously his chest, abdomen, and back. Review of Systems     Review of Systems   Constitutional: Positive for appetite change and chills. Negative for activity change, diaphoresis, fatigue and fever. HENT: Negative. Eyes: Negative. Respiratory: Positive for shortness of breath. Negative for cough, chest tightness and wheezing. Cardiovascular: Positive for chest pain. Negative for palpitations. Gastrointestinal: Positive for abdominal pain, nausea and vomiting. Negative for constipation and diarrhea. Genitourinary: Negative. Musculoskeletal: Positive for back pain. Negative for neck pain. Skin: Negative. Neurological: Negative. Hematological: Negative. Psychiatric/Behavioral: Negative. All other systems reviewed and are negative.       Past Medical, Surgical, Family, and Social History     He has a past medical history of Pancreatitis. He has no past surgical history on file. His family history is not on file. He reports that he has never smoked. He has never used smokeless tobacco. He reports current alcohol use. He reports current drug use. Drug: Marijuana. Medications     Previous Medications    FOLIC ACID (FOLVITE) 1 MG TABLET    Take 1 tablet by mouth daily    PANTOPRAZOLE (PROTONIX) 40 MG TABLET    Take 1 tablet by mouth every morning (before breakfast)    VITAMIN B-1 (THIAMINE) 100 MG TABLET    Take 1 tablet by mouth daily       Allergies     He has No Known Allergies. Physical Exam     INITIAL VITALS: BP: (!) 153/104, Temp: 98.1 °F (36.7 °C), Pulse: 75, Resp: 16, SpO2: 100 %   Physical Exam  Vitals signs reviewed. Constitutional:       Appearance: He is normal weight. He is not toxic-appearing or diaphoretic. HENT:      Head: Normocephalic and atraumatic. Mouth/Throat:      Mouth: Mucous membranes are moist.   Eyes:      Extraocular Movements: Extraocular movements intact. Pupils: Pupils are equal, round, and reactive to light. Cardiovascular:      Rate and Rhythm: Normal rate and regular rhythm. Heart sounds: No murmur. No friction rub. No gallop. Pulmonary:      Effort: Pulmonary effort is normal. No respiratory distress. Breath sounds: Normal breath sounds. No wheezing, rhonchi or rales. Abdominal:      General: Bowel sounds are normal. There is no distension. Palpations: Abdomen is soft. There is no mass. Tenderness: There is generalized abdominal tenderness. There is no guarding or rebound. Comments: Generalized abdominal tenderness tender to light palpation, most severely tender in epigastric region. Skin:     General: Skin is warm. Capillary Refill: Capillary refill takes less than 2 seconds. Neurological:      General: No focal deficit present.       Mental Status: He is alert and oriented to person, place, and time.   Psychiatric:      Comments: Appropriate mood and affect. Diagnostic Results     EKG   Interpreted inconjunction with emergency department physician Capo Moore MD  Rhythm: normal sinus   Rate: normal  Axis: normal  Ectopy: none  Conduction: normal  ST Segments: no acute change  T Waves: no acute change  Q Waves: none  Clinical Impression: no acute changes  Comparison: None    RADIOLOGY:  CT ABDOMEN PELVIS W IV CONTRAST Additional Contrast? None   Final Result      Acute interstitial pancreatitis without evidence of complication.             XR CHEST (2 VW)   Final Result      No consolidation          LABS:   Results for orders placed or performed during the hospital encounter of 09/30/20   CBC Auto Differential   Result Value Ref Range    WBC 14.7 (H) 4.0 - 11.0 K/uL    RBC 4.85 4.20 - 5.90 M/uL    Hemoglobin 15.3 13.5 - 17.5 g/dL    Hematocrit 45.6 40.5 - 52.5 %    MCV 94.0 80.0 - 100.0 fL    MCH 31.5 26.0 - 34.0 pg    MCHC 33.6 31.0 - 36.0 g/dL    RDW 13.6 12.4 - 15.4 %    Platelets 419 887 - 727 K/uL    MPV 9.3 5.0 - 10.5 fL    Neutrophils % 86.7 %    Lymphocytes % 6.3 %    Monocytes % 6.6 %    Eosinophils % 0.0 %    Basophils % 0.4 %    Neutrophils Absolute 12.7 (H) 1.7 - 7.7 K/uL    Lymphocytes Absolute 0.9 (L) 1.0 - 5.1 K/uL    Monocytes Absolute 1.0 0.0 - 1.3 K/uL    Eosinophils Absolute 0.0 0.0 - 0.6 K/uL    Basophils Absolute 0.1 0.0 - 0.2 K/uL   Comprehensive Metabolic Panel w/ Reflex to MG   Result Value Ref Range    Sodium 134 (L) 136 - 145 mmol/L    Potassium reflex Magnesium 3.7 3.5 - 5.1 mmol/L    Chloride 95 (L) 99 - 110 mmol/L    CO2 21 21 - 32 mmol/L    Anion Gap 18 (H) 3 - 16    Glucose 117 (H) 70 - 99 mg/dL    BUN 11 7 - 20 mg/dL    CREATININE 0.8 (L) 0.9 - 1.3 mg/dL    GFR Non-African American >60 >60    GFR African American >60 >60    Calcium 10.0 8.3 - 10.6 mg/dL    Total Protein 8.0 6.4 - 8.2 g/dL    Alb 4.8 3.4 - 5.0 g/dL    Albumin/Globulin Ratio 1.5 1.1 - 2.2    Total Bilirubin 0.8 0.0 - 1.0 mg/dL    Alkaline Phosphatase 67 40 - 129 U/L    ALT 13 10 - 40 U/L    AST 15 15 - 37 U/L    Globulin 3.2 g/dL   Lipase   Result Value Ref Range    Lipase 518.0 (H) 13.0 - 60.0 U/L   EKG 12 Lead   Result Value Ref Range    Ventricular Rate 77 BPM    Atrial Rate 77 BPM    P-R Interval 140 ms    QRS Duration 94 ms    Q-T Interval 400 ms    QTc Calculation (Bazett) 452 ms    P Axis 86 degrees    R Axis 75 degrees    T Axis 61 degrees    Diagnosis       EKG performed in ER and to be interpreted by ER physician. Confirmed by MD, ER ([id number]),  Karina Chaudhari ([address] [facility] [address]), BERT (9) on 9/30/2020 1:27:27 PM       ED BEDSIDE ULTRASOUND:  None    RECENT VITALS:  BP: (!) 153/99, Temp: 98.1 °F (36.7 °C),Pulse: 84, Resp: 16, SpO2: 100 %     Procedures     None    ED Course     Nursing Notes, Past Medical Hx, Past Surgical Hx, Social Hx, Allergies, and FamilyHx were reviewed. The patient was giventhe following medications:  Orders Placed This Encounter   Medications    lactated ringers bolus    morphine (PF) injection 2 mg    ondansetron (ZOFRAN) injection 4 mg    HYDROmorphone (DILAUDID) injection 1 mg    iopamidol (ISOVUE-370) 76 % injection 80 mL       CONSULTS:  Maryse 26 / ASSESSMENT / Gretchen Javier is a 30 y.o. male with past medical history of alcohol abuse and alcoholic pancreatitis who presents with worsening epigastric pain, nausea, and vomiting that started couple days ago after he drank about 6-7 drinks of alcohol. He has not been able to tolerate anything p.o. since yesterday. He rates his pain as the worst pain is ever felt in his life. He says it is worse than his prior episode of alcoholic pancreatitis about a year ago. We will work him up for alcoholic pancreatitis as well as ACS given his chest pain. Ordered CBC, CMP, lipase, chest x-ray, and EKG. Patient does not have risk factors for ACS so we will not order troponin.   Gave 1 L bolus, and IV Zofran and morphine. Work-up notable for a lipase 518 and WBC 14.7. Patient has acute alcoholic pancreatitis. On reexamination, patient still in significant abdominal pain to palpation, however nausea is improved. Ordered CT abdomen pelvis with IV contrast and gave IV Dilaudid. CT scan revealed acute interstitial pancreatitis with no signs of complications. Admitted to medicine team for further management. This patient was also evaluated by the attending physician. All care plans were discussed and agreed upon. Clinical Impression     1. Abdominal pain, epigastric    2. Nausea and vomiting, intractability of vomiting not specified, unspecified vomiting type    3. Alcohol-induced acute pancreatitis, unspecified complication status        Disposition     PATIENT REFERRED TO:  No follow-up provider specified.     DISCHARGE MEDICATIONS:  New Prescriptions    No medications on file       Cierra Bui MD  Resident  09/30/20 [id number]

## 2020-09-30 NOTE — CARE COORDINATION
Referred to patient for ETOH abuse. Spoke to patient. Patient is a 30year old male admitted for pancreatitis. Patient usually lives at home with parents. He is independent in ADLs. Patient denies ETOH as problem, states he drank too much at a party this weekend. Patient is agreeable to treatment resources, resources provided on AVS.  Patient with no PCP, patient agreeable to [place] clinic. patient provided [facility] information on AVS.  Patient reports he has insurance through his parents. Encouraged patient to have parents bring in insurance card. Registration unable to verify patient's insurance. Patient denies other needs at this time. Case Management Assessment           Initial Evaluation                Date / Time of Evaluation: 9/30/2020 4:55 PM                 Assessment Completed by: Mando Alves    Patient Name: Kristi Manzano     YOB: 1995  Diagnosis: Acute pancreatitis, unspecified complication status, unspecified pancreatitis type [K85.90]  Acute pancreatitis, unspecified complication status, unspecified pancreatitis type [K85.90]     Date / Time: 9/30/2020  1:02 PM    Patient Admission Status: Inpatient    If patient is discharged prior to next notation, then this note serves as note for discharge by case management.      Current PCP: Anabell Wallace MD  Clinic Patient: No    Chart Reviewed: Yes  Patient/ Family Interviewed: Yes    Initial assessment completed at bedside with: patient    Hospitalization in the last 30 days: No    Emergency Contacts:  Extended Emergency Contact Information  Primary Emergency Contact: Nelson amaya  Home Phone: [phone]  Mobile Phone: [phone]  Relation: Parent  Secondary Emergency Contact: [address], [address] [place] [address] Phone: [phone]  Relation: Parent    Advance Directives:   Code Status: Prior    [address]: No      Financial  Payor: /     Pre-cert required for SNF: No    Pharmacy    [place] [id number] #[id number] Transition of Care is related to the following treatment goals of Acute pancreatitis, unspecified complication status, unspecified pancreatitis type [K85.90]  Acute pancreatitis, unspecified complication status, unspecified pancreatitis type [K85.90]    The Patient and/or patient representative Shanna Lugo and his family were provided with a choice of provider and agrees with the discharge plan Not Indicated    Freedom of choice list was provided with basic dialogue that supports the patient's individualized plan of care/goals and shares the quality data associated with the providers.  Not Indicated    Care Transition patient: No    MIKEY Godoy, JYOTI-S  [facility]  Case Management Department  Ph: [phone]

## 2020-09-30 NOTE — ACP (ADVANCE CARE PLANNING)
Advanced Care Planning Note. Purpose of Encounter: Advanced care planning in light of acute and chronic deteriorating medical conditions. Parties In Attendance: Patient Tori Castro), Dori Sandhu MD  (myself)    Decisional Capacity: Yes    Subjective: Patient/family understand in this voluntary conversation that Tori Castro continues to deteriorate and discuss what inteventions and plans patient would want implemented in light of the following diagnoses:  -acute alcoholic pancreatitis  -h/o prior EtOH pancreatitis  -Polysubstance use    Objective: Pt has been having chronic decline in functional status with increased dependence on others for ADLs  Increased frequency of Hospitalizations. Likelihood of further hospitalizations with likelihood of escalation of medical interventions with the expectation of returning to a diminishing baseline level of functionality. Discussion highlights: I discussed with patient the ramifications of their acute and chronic medical problems- and the likely outcomes expected, both in terms of statistics, and as part of my experience seeing patients with similar conditions. Goals of Care Determination:  Patient wishes to remain Full code for now, but has interest in continuing this conversation, and discussing with family before making any changes to code status and goals of care parameters. Plan: Continue to educate patient on medical conditions and the choices available regarding possible outcomes with regard to goals of care and code status.        Time spent on Advanced care Plannin minutes    Dori Sandhu MD  2020 5:01 PM

## 2020-09-30 NOTE — ED PROVIDER NOTES
Patient seen and examined discussed with resident agree with plan. This is a 24-year-old -American gentleman with history of pancreatitis induced by alcohol in the past who did a significant amount of drinking yesterday. Then he is had severe epigastric pain associate with nausea vomiting but no diarrhea. On exam he has moderate epigastric tenderness I will really appreciate any guarding or rebound in his bowel sounds are a little on the active side.      Onofre Isaac MD  09/30/20 [id number]

## 2020-09-30 NOTE — ED NOTES
Response from physician about given Potassium every 2 hours x2; was that they would like Potassium level up to 4.0 and to give medication as prescribed.       Allanesha Smith-Narcisse, RN  09/30/20 9758

## 2020-10-01 LAB
ANION GAP SERPL CALCULATED.3IONS-SCNC: 15 MMOL/L (ref 3–16)
BASOPHILS ABSOLUTE: 0 K/UL (ref 0–0.2)
BASOPHILS RELATIVE PERCENT: 0.2 %
BUN BLDV-MCNC: 7 MG/DL (ref 7–20)
CALCIUM SERPL-MCNC: 9.4 MG/DL (ref 8.3–10.6)
CHLORIDE BLD-SCNC: 96 MMOL/L (ref 99–110)
CO2: 22 MMOL/L (ref 21–32)
CREAT SERPL-MCNC: 0.7 MG/DL (ref 0.9–1.3)
EKG ATRIAL RATE: 87 BPM
EKG DIAGNOSIS: NORMAL
EKG P AXIS: 88 DEGREES
EKG P-R INTERVAL: 140 MS
EKG Q-T INTERVAL: 378 MS
EKG QRS DURATION: 84 MS
EKG QTC CALCULATION (BAZETT): 454 MS
EKG R AXIS: 74 DEGREES
EKG T AXIS: 58 DEGREES
EKG VENTRICULAR RATE: 87 BPM
EOSINOPHILS ABSOLUTE: 0 K/UL (ref 0–0.6)
EOSINOPHILS RELATIVE PERCENT: 0.1 %
GFR AFRICAN AMERICAN: >60
GFR NON-AFRICAN AMERICAN: >60
GLUCOSE BLD-MCNC: 87 MG/DL (ref 70–99)
HCT VFR BLD CALC: 42 % (ref 40.5–52.5)
HEMOGLOBIN: 14 G/DL (ref 13.5–17.5)
LYMPHOCYTES ABSOLUTE: 1.3 K/UL (ref 1–5.1)
LYMPHOCYTES RELATIVE PERCENT: 8.4 %
MCH RBC QN AUTO: 31 PG (ref 26–34)
MCHC RBC AUTO-ENTMCNC: 33.3 G/DL (ref 31–36)
MCV RBC AUTO: 93.1 FL (ref 80–100)
MONOCYTES ABSOLUTE: 1.3 K/UL (ref 0–1.3)
MONOCYTES RELATIVE PERCENT: 8 %
NEUTROPHILS ABSOLUTE: 13 K/UL (ref 1.7–7.7)
NEUTROPHILS RELATIVE PERCENT: 83.3 %
PDW BLD-RTO: 13.8 % (ref 12.4–15.4)
PLATELET # BLD: 249 K/UL (ref 135–450)
PMV BLD AUTO: 9.3 FL (ref 5–10.5)
POTASSIUM REFLEX MAGNESIUM: 3.9 MMOL/L (ref 3.5–5.1)
RBC # BLD: 4.51 M/UL (ref 4.2–5.9)
SODIUM BLD-SCNC: 133 MMOL/L (ref 136–145)
WBC # BLD: 15.6 K/UL (ref 4–11)

## 2020-10-01 PROCEDURE — 6360000002 HC RX W HCPCS: Performed by: STUDENT IN AN ORGANIZED HEALTH CARE EDUCATION/TRAINING PROGRAM

## 2020-10-01 PROCEDURE — 93010 ELECTROCARDIOGRAM REPORT: CPT | Performed by: INTERNAL MEDICINE

## 2020-10-01 PROCEDURE — C9113 INJ PANTOPRAZOLE SODIUM, VIA: HCPCS | Performed by: STUDENT IN AN ORGANIZED HEALTH CARE EDUCATION/TRAINING PROGRAM

## 2020-10-01 PROCEDURE — 36415 COLL VENOUS BLD VENIPUNCTURE: CPT

## 2020-10-01 PROCEDURE — 2580000003 HC RX 258: Performed by: STUDENT IN AN ORGANIZED HEALTH CARE EDUCATION/TRAINING PROGRAM

## 2020-10-01 PROCEDURE — 1200000000 HC SEMI PRIVATE

## 2020-10-01 PROCEDURE — 93005 ELECTROCARDIOGRAM TRACING: CPT | Performed by: STUDENT IN AN ORGANIZED HEALTH CARE EDUCATION/TRAINING PROGRAM

## 2020-10-01 PROCEDURE — 6370000000 HC RX 637 (ALT 250 FOR IP): Performed by: STUDENT IN AN ORGANIZED HEALTH CARE EDUCATION/TRAINING PROGRAM

## 2020-10-01 PROCEDURE — 80048 BASIC METABOLIC PNL TOTAL CA: CPT

## 2020-10-01 PROCEDURE — 85025 COMPLETE CBC W/AUTO DIFF WBC: CPT

## 2020-10-01 RX ADMIN — HYDROMORPHONE HYDROCHLORIDE 1 MG: 1 INJECTION, SOLUTION INTRAMUSCULAR; INTRAVENOUS; SUBCUTANEOUS at 16:46

## 2020-10-01 RX ADMIN — PANTOPRAZOLE SODIUM 40 MG: 40 INJECTION, POWDER, FOR SOLUTION INTRAVENOUS at 08:28

## 2020-10-01 RX ADMIN — SODIUM CHLORIDE, POTASSIUM CHLORIDE, SODIUM LACTATE AND CALCIUM CHLORIDE: 600; 310; 30; 20 INJECTION, SOLUTION INTRAVENOUS at 04:41

## 2020-10-01 RX ADMIN — ACETAMINOPHEN 650 MG: 325 TABLET ORAL at 01:37

## 2020-10-01 RX ADMIN — HYDROMORPHONE HYDROCHLORIDE 1 MG: 1 INJECTION, SOLUTION INTRAMUSCULAR; INTRAVENOUS; SUBCUTANEOUS at 04:41

## 2020-10-01 RX ADMIN — SODIUM CHLORIDE, POTASSIUM CHLORIDE, SODIUM LACTATE AND CALCIUM CHLORIDE: 600; 310; 30; 20 INJECTION, SOLUTION INTRAVENOUS at 22:38

## 2020-10-01 RX ADMIN — ACETAMINOPHEN 650 MG: 325 TABLET ORAL at 08:28

## 2020-10-01 RX ADMIN — Medication 10 ML: at 08:28

## 2020-10-01 RX ADMIN — HYDROMORPHONE HYDROCHLORIDE 1 MG: 1 INJECTION, SOLUTION INTRAMUSCULAR; INTRAVENOUS; SUBCUTANEOUS at 21:09

## 2020-10-01 RX ADMIN — HYDROMORPHONE HYDROCHLORIDE 0.5 MG: 1 INJECTION, SOLUTION INTRAMUSCULAR; INTRAVENOUS; SUBCUTANEOUS at 01:36

## 2020-10-01 RX ADMIN — SODIUM CHLORIDE, POTASSIUM CHLORIDE, SODIUM LACTATE AND CALCIUM CHLORIDE: 600; 310; 30; 20 INJECTION, SOLUTION INTRAVENOUS at 16:05

## 2020-10-01 RX ADMIN — HYDROMORPHONE HYDROCHLORIDE 1 MG: 1 INJECTION, SOLUTION INTRAMUSCULAR; INTRAVENOUS; SUBCUTANEOUS at 08:46

## 2020-10-01 RX ADMIN — SODIUM CHLORIDE, POTASSIUM CHLORIDE, SODIUM LACTATE AND CALCIUM CHLORIDE: 600; 310; 30; 20 INJECTION, SOLUTION INTRAVENOUS at 10:26

## 2020-10-01 RX ADMIN — HYDROMORPHONE HYDROCHLORIDE 1 MG: 1 INJECTION, SOLUTION INTRAMUSCULAR; INTRAVENOUS; SUBCUTANEOUS at 12:45

## 2020-10-01 RX ADMIN — Medication 10 ML: at 21:10

## 2020-10-01 RX ADMIN — ACETAMINOPHEN 650 MG: 325 TABLET ORAL at 16:51

## 2020-10-01 ASSESSMENT — PAIN DESCRIPTION - ONSET
ONSET: ON-GOING

## 2020-10-01 ASSESSMENT — ENCOUNTER SYMPTOMS
BACK PAIN: 1
CHEST TIGHTNESS: 0
BLOOD IN STOOL: 0
ABDOMINAL PAIN: 1
DIARRHEA: 0
NAUSEA: 0
VOMITING: 0
EYE PAIN: 0
SORE THROAT: 0
ABDOMINAL DISTENTION: 0
EYE ITCHING: 0
TROUBLE SWALLOWING: 0
CONSTIPATION: 0
SHORTNESS OF BREATH: 0

## 2020-10-01 ASSESSMENT — PAIN SCALES - GENERAL
PAINLEVEL_OUTOF10: 9
PAINLEVEL_OUTOF10: 0
PAINLEVEL_OUTOF10: 7
PAINLEVEL_OUTOF10: 9
PAINLEVEL_OUTOF10: 0
PAINLEVEL_OUTOF10: 0
PAINLEVEL_OUTOF10: 8
PAINLEVEL_OUTOF10: 5

## 2020-10-01 ASSESSMENT — PAIN DESCRIPTION - DESCRIPTORS
DESCRIPTORS: SHARP

## 2020-10-01 ASSESSMENT — PAIN DESCRIPTION - ORIENTATION
ORIENTATION: RIGHT;LEFT;MID
ORIENTATION: RIGHT;LEFT;MID

## 2020-10-01 ASSESSMENT — PAIN - FUNCTIONAL ASSESSMENT

## 2020-10-01 ASSESSMENT — PAIN DESCRIPTION - FREQUENCY
FREQUENCY: CONTINUOUS

## 2020-10-01 ASSESSMENT — PAIN DESCRIPTION - PROGRESSION
CLINICAL_PROGRESSION: NOT CHANGED
CLINICAL_PROGRESSION: GRADUALLY WORSENING
CLINICAL_PROGRESSION: NOT CHANGED
CLINICAL_PROGRESSION: GRADUALLY WORSENING
CLINICAL_PROGRESSION: GRADUALLY WORSENING

## 2020-10-01 ASSESSMENT — PAIN DESCRIPTION - LOCATION
LOCATION: ABDOMEN;BACK
LOCATION: ABDOMEN
LOCATION: ABDOMEN;BACK

## 2020-10-01 ASSESSMENT — PAIN DESCRIPTION - PAIN TYPE
TYPE: ACUTE PAIN

## 2020-10-01 NOTE — PROGRESS NOTES
Progress Note    Admit Date: 9/30/2020  Day: 2  Diet: DIET CLEAR LIQUID;    CC: Abdominal and chest pain    Interval history: Pt currently NPO, receiving IVF. Patient is rating his abdominal pain at 9/10, improved from night before. Has not had a BM or passed any gas today. Denies fevers, chills, shortness of breath, or weakness. HPI: Mr. Heladio Bailey is a 24-yo male with a PMHx of alcohol abuse and alcoholic pancreatitis, presenting with 2-3 days of worsening abdominal and chest pain, rating it 20/10. Pt also has associated N/V, and back pain. Symptoms started after he drank 6-7 drinks, which is his usual weekly amount to drink. Previous episode of alcoholic pancreatitis was 1-year prior. Patient has had decreased appetite, had been vomiting in the AM and last night. Patient denies fevers, diarrheas, recent illness or sick contacts. In ED, labs significant for elevated lipase, leukocytosis, and CT Abdomen c/w acute pancreatitis. Medications:     Scheduled Meds:   sodium chloride flush  10 mL Intravenous 2 times per day    enoxaparin  40 mg Subcutaneous Daily    pantoprazole  40 mg Intravenous Daily     Continuous Infusions:   lactated ringers 175 mL/hr at 10/01/20 1026     PRN Meds:HYDROmorphone **OR** HYDROmorphone, sodium chloride flush, acetaminophen **OR** acetaminophen, polyethylene glycol, promethazine **OR** ondansetron    Objective:   Vitals:   T-max:  Patient Vitals for the past 8 hrs:   BP Temp Temp src Pulse Resp SpO2   10/01/20 0820 (!) 148/97 98.2 °F (36.8 °C) Oral 83 16 98 %   10/01/20 0515 133/83 97.9 °F (36.6 °C) Axillary 85 18 95 %       Intake/Output Summary (Last 24 hours) at 10/1/2020 1146  Last data filed at 9/30/2020 2023  Gross per 24 hour   Intake --   Output 600 ml   Net -600 ml       Review of Systems   Constitutional: Negative for chills, fatigue and fever. HENT: Negative for sneezing, sore throat and trouble swallowing. Eyes: Negative for pain and itching.    Respiratory: Negative for chest tightness and shortness of breath. Cardiovascular: Positive for chest pain. Gastrointestinal: Positive for abdominal pain. Negative for abdominal distention, blood in stool, constipation, diarrhea, nausea and vomiting. Genitourinary: Negative for difficulty urinating, flank pain and urgency. Musculoskeletal: Positive for back pain. Negative for myalgias. Neurological: Negative for tremors, weakness, light-headedness and headaches. Psychiatric/Behavioral: Negative for confusion. The patient is not nervous/anxious. Physical Exam  Constitutional:       General: He is not in acute distress. Appearance: Normal appearance. HENT:      Head: Normocephalic and atraumatic. Nose: Nose normal.   Eyes:      General: No scleral icterus. Cardiovascular:      Rate and Rhythm: Normal rate and regular rhythm. Pulses: Normal pulses. Pulmonary:      Effort: No respiratory distress. Breath sounds: Normal breath sounds. Abdominal:      General: There is no distension. Palpations: There is no mass. Tenderness: There is abdominal tenderness. There is guarding. There is no rebound. Comments: Epigastric, LLQ moderate tenderness to light palpation. Moderate guarding. Musculoskeletal:         General: No swelling. Skin:     General: Skin is warm and dry. Neurological:      Mental Status: He is alert and oriented to person, place, and time. Motor: No weakness.    Psychiatric:         Mood and Affect: Mood normal.         Behavior: Behavior normal.         LABS:    CBC:   Recent Labs     09/30/20  1338 10/01/20  0635   WBC 14.7* 15.6*   HGB 15.3 14.0   HCT 45.6 42.0    249   MCV 94.0 93.1     Renal:    Recent Labs     09/30/20  1338 09/30/20  1740 10/01/20  0635   *  --  133*   K 3.7  --  3.9   CL 95*  --  96*   CO2 21  --  22   BUN 11  --  7   CREATININE 0.8*  --  0.7*   GLUCOSE 117*  --  87   CALCIUM 10.0  --  9.4   MG  --  2.30  -- and plan as outlined above.       Lydia Kumari MD [facility]

## 2020-10-01 NOTE — ED NOTES
Report given to VALDEZ Hester. All question asked, answered.       Allanesha Smith-Narcisse, RN  09/30/20 2004

## 2020-10-01 NOTE — PROGRESS NOTES
Negative for chest tightness and shortness of breath. Cardiovascular: Positive for chest pain. Gastrointestinal: Positive for abdominal pain. Negative for abdominal distention, blood in stool, constipation, diarrhea, nausea and vomiting. Genitourinary: Negative for difficulty urinating, flank pain and urgency. Musculoskeletal: Positive for back pain. Negative for myalgias. Neurological: Negative for tremors, weakness, light-headedness and headaches. Psychiatric/Behavioral: Negative for confusion. The patient is not nervous/anxious. Physical Exam  Constitutional:       General: He is not in acute distress. Appearance: Normal appearance. HENT:      Head: Normocephalic and atraumatic. Nose: Nose normal.   Eyes:      General: No scleral icterus. Cardiovascular:      Rate and Rhythm: Normal rate and regular rhythm. Pulses: Normal pulses. Pulmonary:      Effort: No respiratory distress. Breath sounds: Normal breath sounds. Abdominal:      General: There is no distension. Palpations: There is no mass. Tenderness: There is abdominal tenderness. There is guarding. There is no rebound. Comments: Epigastric, LLQ moderate tenderness to light palpation. Moderate guarding. Musculoskeletal:         General: No swelling. Skin:     General: Skin is warm and dry. Neurological:      Mental Status: He is alert and oriented to person, place, and time. Motor: No weakness.    Psychiatric:         Mood and Affect: Mood normal.         Behavior: Behavior normal.         LABS:    CBC:   Recent Labs     09/30/20  1338 10/01/20  0635   WBC 14.7* 15.6*   HGB 15.3 14.0   HCT 45.6 42.0    249   MCV 94.0 93.1     Renal:    Recent Labs     09/30/20  1338 09/30/20  1740 10/01/20  0635   *  --  133*   K 3.7  --  3.9   CL 95*  --  96*   CO2 21  --  22   BUN 11  --  7   CREATININE 0.8*  --  0.7*   GLUCOSE 117*  --  87   CALCIUM 10.0  --  9.4   MG  --  2.30  -- ANIONGAP 18*  --  15     Hepatic:   Recent Labs     09/30/20  1338   AST 15   ALT 13   BILITOT 0.8   PROT 8.0   LABALBU 4.8   ALKPHOS 67     Troponin: No results for input(s): TROPONINI in the last 72 hours. BNP: No results for input(s): BNP in the last 72 hours. Lipids: No results for input(s): CHOL, HDL in the last 72 hours. Invalid input(s): LDLCALCU, TRIGLYCERIDE  ABGs:  No results for input(s): PHART, QIU3WGL, PO2ART, DXE4RWF, BEART, THGBART, W2EHSPIX, AGR4DQG in the last 72 hours. INR: No results for input(s): INR in the last 72 hours. Lactate: No results for input(s): LACTATE in the last 72 hours. Cultures:  -----------------------------------------------------------------  RAD:   CT ABDOMEN PELVIS W IV CONTRAST Additional Contrast? None   Final Result      Acute interstitial pancreatitis without evidence of complication. XR CHEST (2 VW)   Final Result      No consolidation          Assessment/Plan:   Mr. Shelby Wright is a 24-yo man w/ PMHx of alcohol abuse and alcoholic pancreatitis presenting with abdominal, back and chest pain. Epigastric Pain 2/2 Acute Pancreatitis d/t Alcohol Use  - Elevated lipase at 518, leukocytosis at 14.7 on admission - current WBC at 15.6  - CT Abdomen: Acute interstitial pancreatitis   - Improved but still present pain, currently 9/10. With moderate TTP at epigastric region. Hemodynamically stable.    [ ] IV Fluids   [ ] Transition to clear liquid diet  [ ] IV Protonix   [ ] Pain control: IV Dilaudid, Tylenol    Alcohol, Marijuana Abuse  - U-Tox (+): cannabis, and opiates  - Counseled regarding substance abuse, alcohol cessation    Code Status: Full code  FEN: Clear liquid  PPX: Lovenox, Protonix  DISPO: ASHLEY    Victoria Motta, MS4  10/01/20  10:59 AM    This patient has been staffed and discussed with Daniela Beach MD.

## 2020-10-01 NOTE — PROGRESS NOTES
Admission: Patient received to room 6314 from ED. Patient admitted with Dx of acute pancreatitis. Patient A&Ox4 upon arrival. VSS, but BP was elevated and in 9/10 pain in epigastric area. Patient oriented to room, staff, and call system. Educated on fall protocol and hourly rounding. Assessment as documented. Admission navigator complete. IVF infusing. Bed locked in low position. Patient informed to utilize call light with any needs. Pt verbalized understanding. Call light within reach. Will continue to monitor.

## 2020-10-01 NOTE — FLOWSHEET NOTE
10/01/20 1126   Encounter Summary   Services provided to: Patient   Referral/Consult From: [facility]   (10/1/2020, ADALID.)   Complexity of Encounter Moderate   Length of Encounter 15 minutes   Routine   Type Initial   Assessment Approachable   Intervention Nurtured hope

## 2020-10-01 NOTE — PROGRESS NOTES
4 Eyes Admission Assessment     I agree as the admission nurse that 2 RN's have performed a thorough Head to Toe Skin Assessment on the patient. ALL assessment sites listed below have been assessed on admission. Areas assessed by both nurses:   [x]   Head, Face, and Ears   [x]   Shoulders, Back, and Chest  [x]   Arms, Elbows, and Hands   [x]   Coccyx, Sacrum, and Ischium  [x]   Legs, Feet, and Heels        Does the Patient have Skin Breakdown?   No         Juan Prevention initiated:  No   Wound Care Orders initiated:  No      [facility] nurse consulted for Pressure Injury (Stage 3,4, Unstageable, DTI, NWPT, and Complex wounds) or Juan score 18 or lower:  No      Nurse 1 eSignature: Electronically signed by Alissa Manriquez RN on 10/1/20 at 12:55 AM EDT    **SHARE this note so that the co-signing nurse is able to place an eSignature**    Nurse 2 eSignature: Electronically signed by Bharti Saldaña RN on 10/1/20 at 1:57 AM EDT  ]

## 2020-10-01 NOTE — CARE COORDINATION
Case Management Assessment           Daily Note                 Date/ Time of Note: 10/1/2020 1:45 PM         Note completed by: Harper Aguilar    Patient Name: Nelsy Dennis  YOB: 1995    Diagnosis:Acute pancreatitis, unspecified complication status, unspecified pancreatitis type [K85.90]  Acute pancreatitis, unspecified complication status, unspecified pancreatitis type [K85.90]  Acute pancreatitis, unspecified complication status, unspecified pancreatitis type [K85.90]  Acute pancreatitis, unspecified complication status, unspecified pancreatitis type [K85.90]  Patient Admission Status: Inpatient    Date of Admission:9/30/2020  1:02 PM Length of Stay: 1 GLOS:      Current Plan of Care: Pancreatitis:  NPO , Pain control. IV dilaudid  IV protonix:  IVF.   ________________________________________________________________________________________  PT AM-PAC:   / 24 per last evaluation on: NA    OT AM-PAC:   / 24 per last evaluation on: NA    DME Needs for discharge: NA   ________________________________________________________________________________________  Discharge Plan: Home  With parents     Tentative discharge date: 1-2 days     Current barriers to discharge: adv diet  As tolerated, pain control      Referrals completed: Not Applicable    Resources/ information provided: [facility] for Alcohol/Substance Abuse  ________________________________________________________________________________________  Case Management Notes:CM cont to follow for d/c planning and  Needs ,  Patient plans to return to parents  Home at  D/c  , no needs anticipated cont to follow . William Lawton and his family were provided with choice of provider; he and his family are in agreement with the discharge plan.     Care Transition Patient: Kanwal Aguilar RN  [facility]  Case Management Department  Ph: [phone]

## 2020-10-01 NOTE — PROGRESS NOTES
Progress Note  Admit Date: 9/30/2020       Patient seen and examined  Feeling a little better  Pain is 7/10 throbbing non radiating alleviated with rest worsened with movement no associated signs or symptoms nausea has improved. No other issues at this time. Scheduled Medications:    sodium chloride flush  10 mL Intravenous 2 times per day    enoxaparin  40 mg Subcutaneous Daily    pantoprazole  40 mg Intravenous Daily      PRN Medications: HYDROmorphone **OR** HYDROmorphone, sodium chloride flush, acetaminophen **OR** acetaminophen, polyethylene glycol, promethazine **OR** ondansetron  Diet: DIET CLEAR LIQUID;    Continuous Infusions:   lactated ringers 175 mL/hr at 10/01/20 1605       PHYSICAL EXAM:  /79   Pulse 92   Temp 98 °F (36.7 °C) (Oral)   Resp 16   Ht 6' 3\" (1.905 m)   Wt 183 lb (83 kg)   SpO2 99%   BMI 22.87 kg/m²   No results for input(s): POCGLU in the last 72 hours.     Intake/Output Summary (Last 24 hours) at 10/1/2020 1828  Last data filed at 10/1/2020 1603  Gross per 24 hour   Intake 2240 ml   Output 600 ml   Net 1640 ml       /79   Pulse 92   Temp 98 °F (36.7 °C) (Oral)   Resp 16   Ht 6' 3\" (1.905 m)   Wt 183 lb (83 kg)   SpO2 99%   BMI 22.87 kg/m²   General appearance: alert, appears stated age and cooperative  Head: Normocephalic, without obvious abnormality, atraumatic  Neck: no adenopathy, no carotid bruit, no JVD, supple, symmetrical, trachea midline and thyroid not enlarged, symmetric, no tenderness/mass/nodules  Lungs: clear to auscultation bilaterally  Heart: regular rate and rhythm, S1, S2 normal, no murmur, click, rub or gallop  Abdomen: soft non distended tender to deep palpation otherwise bs decreased  Extremities: extremities normal, atraumatic, no cyanosis or edema  Pulses: 2+ and symmetric    LABS:  Recent Labs     09/30/20  1338 10/01/20  0635   WBC 14.7* 15.6*   HGB 15.3 14.0   HCT 45.6 42.0    249 Recent Labs     09/30/20  1338 10/01/20  0635   * 133*   K 3.7 3.9   CL 95* 96*   CO2 21 22   BUN 11 7   CREATININE 0.8* 0.7*   GLUCOSE 117* 87     Recent Labs     09/30/20  1338   AST 15   ALT 13   BILITOT 0.8   ALKPHOS 67     No results for input(s): TROPONINI in the last 72 hours. No results for input(s): BNP in the last 72 hours. No results for input(s): CHOL, HDL in the last 72 hours. Invalid input(s): LDLCALCU  No results for input(s): INR in the last 72 hours. Assessment & Plan:    Patient Active Problem List:     Alcohol-induced acute pancreatitis without infection or necrosis     Alcohol abuse     High risk sexual behavior     Acute pancreatitis        The patient and / or the family were informed of the results of any tests, a time was given to answer questions, a plan was proposed and they agreed with plan.   Disposition: ***  Full Code      MD Eric Mendiola

## 2020-10-02 VITALS
OXYGEN SATURATION: 100 % | DIASTOLIC BLOOD PRESSURE: 89 MMHG | SYSTOLIC BLOOD PRESSURE: 141 MMHG | BODY MASS INDEX: 22.75 KG/M2 | WEIGHT: 183 LBS | HEIGHT: 75 IN | HEART RATE: 80 BPM | TEMPERATURE: 97.4 F | RESPIRATION RATE: 16 BRPM

## 2020-10-02 LAB
ANION GAP SERPL CALCULATED.3IONS-SCNC: 8 MMOL/L (ref 3–16)
BASOPHILS ABSOLUTE: 0 K/UL (ref 0–0.2)
BASOPHILS RELATIVE PERCENT: 0.4 %
BUN BLDV-MCNC: 4 MG/DL (ref 7–20)
CALCIUM SERPL-MCNC: 9.4 MG/DL (ref 8.3–10.6)
CHLORIDE BLD-SCNC: 95 MMOL/L (ref 99–110)
CO2: 30 MMOL/L (ref 21–32)
CREAT SERPL-MCNC: 0.7 MG/DL (ref 0.9–1.3)
EOSINOPHILS ABSOLUTE: 0.1 K/UL (ref 0–0.6)
EOSINOPHILS RELATIVE PERCENT: 1.1 %
GFR AFRICAN AMERICAN: >60
GFR NON-AFRICAN AMERICAN: >60
GLUCOSE BLD-MCNC: 133 MG/DL (ref 70–99)
HCT VFR BLD CALC: 37.8 % (ref 40.5–52.5)
HEMOGLOBIN: 12.9 G/DL (ref 13.5–17.5)
LYMPHOCYTES ABSOLUTE: 1.1 K/UL (ref 1–5.1)
LYMPHOCYTES RELATIVE PERCENT: 10.5 %
MCH RBC QN AUTO: 31.7 PG (ref 26–34)
MCHC RBC AUTO-ENTMCNC: 34.2 G/DL (ref 31–36)
MCV RBC AUTO: 92.6 FL (ref 80–100)
MONOCYTES ABSOLUTE: 1.1 K/UL (ref 0–1.3)
MONOCYTES RELATIVE PERCENT: 10.6 %
NEUTROPHILS ABSOLUTE: 8.3 K/UL (ref 1.7–7.7)
NEUTROPHILS RELATIVE PERCENT: 77.4 %
PDW BLD-RTO: 13.3 % (ref 12.4–15.4)
PLATELET # BLD: 232 K/UL (ref 135–450)
PMV BLD AUTO: 9.1 FL (ref 5–10.5)
POTASSIUM REFLEX MAGNESIUM: 3.7 MMOL/L (ref 3.5–5.1)
RBC # BLD: 4.08 M/UL (ref 4.2–5.9)
SODIUM BLD-SCNC: 133 MMOL/L (ref 136–145)
WBC # BLD: 10.8 K/UL (ref 4–11)

## 2020-10-02 PROCEDURE — 6370000000 HC RX 637 (ALT 250 FOR IP): Performed by: STUDENT IN AN ORGANIZED HEALTH CARE EDUCATION/TRAINING PROGRAM

## 2020-10-02 PROCEDURE — 85025 COMPLETE CBC W/AUTO DIFF WBC: CPT

## 2020-10-02 PROCEDURE — 36415 COLL VENOUS BLD VENIPUNCTURE: CPT

## 2020-10-02 PROCEDURE — 6360000002 HC RX W HCPCS: Performed by: STUDENT IN AN ORGANIZED HEALTH CARE EDUCATION/TRAINING PROGRAM

## 2020-10-02 PROCEDURE — 80048 BASIC METABOLIC PNL TOTAL CA: CPT

## 2020-10-02 PROCEDURE — 2580000003 HC RX 258: Performed by: STUDENT IN AN ORGANIZED HEALTH CARE EDUCATION/TRAINING PROGRAM

## 2020-10-02 PROCEDURE — C9113 INJ PANTOPRAZOLE SODIUM, VIA: HCPCS | Performed by: STUDENT IN AN ORGANIZED HEALTH CARE EDUCATION/TRAINING PROGRAM

## 2020-10-02 RX ORDER — PANTOPRAZOLE SODIUM 40 MG/1
40 TABLET, DELAYED RELEASE ORAL
Status: DISCONTINUED | OUTPATIENT
Start: 2020-10-03 | End: 2020-10-02 | Stop reason: HOSPADM

## 2020-10-02 RX ORDER — OXYCODONE HYDROCHLORIDE AND ACETAMINOPHEN 5; 325 MG/1; MG/1
1 TABLET ORAL EVERY 6 HOURS PRN
Qty: 20 TABLET | Refills: 0 | Status: SHIPPED | OUTPATIENT
Start: 2020-10-02 | End: 2020-10-07

## 2020-10-02 RX ORDER — OXYCODONE HYDROCHLORIDE AND ACETAMINOPHEN 5; 325 MG/1; MG/1
1 TABLET ORAL ONCE
Status: COMPLETED | OUTPATIENT
Start: 2020-10-02 | End: 2020-10-02

## 2020-10-02 RX ORDER — OXYCODONE HYDROCHLORIDE AND ACETAMINOPHEN 5; 325 MG/1; MG/1
1 TABLET ORAL EVERY 4 HOURS PRN
Status: DISCONTINUED | OUTPATIENT
Start: 2020-10-02 | End: 2020-10-02

## 2020-10-02 RX ORDER — POLYETHYLENE GLYCOL 3350 17 G/17G
17 POWDER, FOR SOLUTION ORAL PRN
Status: DISCONTINUED | OUTPATIENT
Start: 2020-10-02 | End: 2020-10-02 | Stop reason: HOSPADM

## 2020-10-02 RX ADMIN — ACETAMINOPHEN 650 MG: 325 TABLET ORAL at 08:50

## 2020-10-02 RX ADMIN — HYDROMORPHONE HYDROCHLORIDE 1 MG: 1 INJECTION, SOLUTION INTRAMUSCULAR; INTRAVENOUS; SUBCUTANEOUS at 09:16

## 2020-10-02 RX ADMIN — OXYCODONE HYDROCHLORIDE AND ACETAMINOPHEN 1 TABLET: 5; 325 TABLET ORAL at 13:27

## 2020-10-02 RX ADMIN — ACETAMINOPHEN 650 MG: 325 TABLET ORAL at 00:33

## 2020-10-02 RX ADMIN — HYDROMORPHONE HYDROCHLORIDE 1 MG: 1 INJECTION, SOLUTION INTRAMUSCULAR; INTRAVENOUS; SUBCUTANEOUS at 05:24

## 2020-10-02 RX ADMIN — HYDROMORPHONE HYDROCHLORIDE 1 MG: 1 INJECTION, SOLUTION INTRAMUSCULAR; INTRAVENOUS; SUBCUTANEOUS at 01:11

## 2020-10-02 RX ADMIN — PANTOPRAZOLE SODIUM 40 MG: 40 INJECTION, POWDER, FOR SOLUTION INTRAVENOUS at 08:50

## 2020-10-02 RX ADMIN — Medication 10 ML: at 08:50

## 2020-10-02 ASSESSMENT — ENCOUNTER SYMPTOMS
ABDOMINAL PAIN: 1
SHORTNESS OF BREATH: 0
ABDOMINAL DISTENTION: 0
SORE THROAT: 0
COUGH: 0
DIARRHEA: 0
EYE PAIN: 0
CHEST TIGHTNESS: 0
VOMITING: 0
BACK PAIN: 1
BLOOD IN STOOL: 0
NAUSEA: 0
EYE ITCHING: 0

## 2020-10-02 ASSESSMENT — PAIN SCALES - GENERAL
PAINLEVEL_OUTOF10: 3
PAINLEVEL_OUTOF10: 9
PAINLEVEL_OUTOF10: 2
PAINLEVEL_OUTOF10: 9
PAINLEVEL_OUTOF10: 9
PAINLEVEL_OUTOF10: 7

## 2020-10-02 ASSESSMENT — PAIN DESCRIPTION - ORIENTATION
ORIENTATION: RIGHT;LEFT
ORIENTATION: RIGHT;LEFT

## 2020-10-02 ASSESSMENT — PAIN DESCRIPTION - FREQUENCY
FREQUENCY: CONTINUOUS
FREQUENCY: CONTINUOUS

## 2020-10-02 ASSESSMENT — PAIN DESCRIPTION - LOCATION
LOCATION: ABDOMEN
LOCATION: ABDOMEN

## 2020-10-02 ASSESSMENT — PAIN DESCRIPTION - PROGRESSION
CLINICAL_PROGRESSION: NOT CHANGED
CLINICAL_PROGRESSION: NOT CHANGED

## 2020-10-02 ASSESSMENT — PAIN DESCRIPTION - DESCRIPTORS
DESCRIPTORS: SHARP
DESCRIPTORS: SHARP

## 2020-10-02 ASSESSMENT — PAIN DESCRIPTION - PAIN TYPE
TYPE: ACUTE PAIN
TYPE: ACUTE PAIN

## 2020-10-02 ASSESSMENT — PAIN DESCRIPTION - ONSET
ONSET: ON-GOING
ONSET: ON-GOING

## 2020-10-02 NOTE — DISCHARGE SUMMARY
INTERNAL MEDICINE DEPARTMENT AT [address] [place] [address]  Discharge Summary At the [facility]    Patient ID: Josefina Hillman                                             Discharge Date: 10/2/2020   Patient's PCP: Leisa Russ MD                                          Discharge Physician: Tami Simon MD  Admit Date: 9/30/2020   Admitting Physician: Alessandra Lindsay MD    PROBLEMS DURING HOSPITALIZATION: Acute Pancreatitis    DISCHARGE DIAGNOSES: Acute Pancreatitis; Alcoholic Pancreatitis    Hospital Course:  Mr. Caden Hardin is a 24-yo male with PMHx significant for alcohol abuse and alcoholic pancreatitis (1-year prior), presenting with 2-3 days of worsening abdominal and chest pain, rating 20/10. He also had associated N/V and back pain. Symptoms started after an alcoholic binge at a party. Patient has otherwise had reported good-control of his drinking for the past year. In the ED, his labs were significant for leukocytosis and elevated Lipase. CT abdomen showed acute interstitial pancreatitis. Pt was admitted for management with IV fluids, Protonix, and Dilaudid. He was transitioned gradually to a low-fiber diet and tolerated it well. He was discharged in stable condition, with PO Oxycodone for 5 days for pain control. Acute Pancreatitis d/t Alcohol Use  - Managed as mentioned above    Alcohol, Marijuana Abuse  - Patient was counseled regarding substance abuse and alcohol cessation    Physical Exam:  BP (!) 141/89   Pulse 80   Temp 97.4 °F (36.3 °C) (Oral)   Resp 16   Ht 6' 3\" (1.905 m)   Wt 183 lb (83 kg)   SpO2 100%   BMI 22.87 kg/m²     Physical Exam  Constitutional:       General: He is not in acute distress. Appearance: Normal appearance. HENT:      Head: Normocephalic and atraumatic. Eyes:      General: No scleral icterus. Conjunctiva/sclera: Conjunctivae normal.   Cardiovascular:      Rate and Rhythm: Normal rate and regular rhythm. Pulses: Normal pulses.       Heart sounds: Normal heart sounds. Pulmonary:      Effort: No respiratory distress. Breath sounds: Normal breath sounds. Chest:      Chest wall: No tenderness. Abdominal:      General: Bowel sounds are normal. There is no distension. Palpations: There is no mass. Tenderness: There is abdominal tenderness. There is no guarding or rebound. Comments: Epigastric tenderness upon palpation. Musculoskeletal:         General: No swelling or tenderness. Skin:     General: Skin is warm and dry. Neurological:      Mental Status: He is alert and oriented to person, place, and time. Psychiatric:         Mood and Affect: Mood normal.         Behavior: Behavior normal.        Significant Diagnostic Studies:   Disposition: home  Discharged Condition: Stable  Follow Up: Primary Care Physician in one week    DISCHARGE MEDICATION:     Medication List      START taking these medications    oxyCODONE-acetaminophen 5-325 MG per tablet  Commonly known as:  Percocet  Take 1 tablet by mouth every 6 hours as needed for Pain for up to 5 days. Intended supply: 5 days. Take lowest dose possible to manage pain        CONTINUE taking these medications    folic acid 1 MG tablet  Commonly known as:  FOLVITE  Take 1 tablet by mouth daily     pantoprazole 40 MG tablet  Commonly known as:  PROTONIX  Take 1 tablet by mouth every morning (before breakfast)     vitamin B-1 100 MG tablet  Commonly known as:  THIAMINE  Take 1 tablet by mouth daily           Where to Get Your Medications      You can get these medications from any pharmacy    Bring a paper prescription for each of these medications  · oxyCODONE-acetaminophen 5-325 MG per tablet       Activity: activity as tolerated  Diet: low fat, low cholesterol diet  Wound Care: none needed    Time Spent on discharge is more than 45 minutes    Signed:  Omari Samuel MD, PGY-1  10/2/2020    Patient seen and examined, labs and imaging studies reviewed, agree with assessment and plan as outlined above. Continue with dc planning asked to monitor for recurrence of symptoms or new symptoms including but not limited to chest pain shortness of breath nausea vomiting fevers or chills and seek immediate medical attention or call 911. Greater than 30 minutes spent on case on day of discharge. Discussed etoh cessation at length.      MD Jo Ren

## 2020-10-02 NOTE — CARE COORDINATION
at discharge, or pharmacy can deliver to the bedside if staff is available. (payment due at time of pick-up or delivery - cash, check, or card accepted)     Able to afford home medications/ co-pay costs: No    ADLS:  Current PT AM-PAC Score:   /24  Current OT AM-PAC Score:   /24      DISCHARGE Disposition: Home- No Services Needed    LOC at discharge: Not Applicable  YOANDY Completed: No    Notification completed in HENS/PAS?:  Not Applicable    IMM Completed:   Not Indicated    Transportation:  Transportation PLAN for discharge: family   Mode of Transport: Not Applicable  Reason for medical transport: Not Applicable  Name of [address] [place] [address],[address]: Not Applicable  Time of Transport: when available    Transport form completed: No    Home Care:  [address] ordered at discharge: No  [address]: Not Applicable  Orders faxed: No    Durable Medical Equipment:  DME Provider: ANDREINA  Equipment obtained during hospitalization: NA    Home Oxygen and Respiratory Equipment:  Oxygen needed at discharge?: No  Dialysis:  Dialysis patient: No    Dialysis Center:  Not Applicable    Hospice Services:  Location: Not Applicable  Agency: Not Applicable    Consents signed: Not Indicated    Referrals made at [facility] for outpatient continued care:  [facility]    Additional CM Notes:   CM confirmed  D/c home w/ family : out pt follow up with [facility]  infor  And phone number on [facility]  To make appt.   No HHC needs  <   Resources  Provided: [facility]     New Rx  :      these medications from any pharmacy with your printed prescription   1 oxyCODONE-acetaminophen      The Plan for Transition of Care is related to the following treatment goals of Acute pancreatitis, unspecified complication status, unspecified pancreatitis type [K85.90]  Acute pancreatitis, unspecified complication status, unspecified pancreatitis type [K85.90]  Acute pancreatitis, unspecified complication status, unspecified pancreatitis type [K85.90]  Acute pancreatitis, unspecified complication status, unspecified pancreatitis type [K85.90]    The Patient and/or patient representative Michael Mckeon and his family were provided with a choice of provider and agrees with the discharge plan Yes    Freedom of choice list was provided with basic dialogue that supports the patient's individualized plan of care/goals and shares the quality data associated with the providers.  Yes    Care Transitions patient: No    Sarita Neff RN  [facility]  Case Management Department  Ph: [phone]

## 2020-10-02 NOTE — PROGRESS NOTES
Received discharge order. Patient tolerated Percocet. Patient called his mother for a ride home. IV removed. Patient given script for Percocet. Reviewed discharge orders. Reviewed medications and follow up instructions. Patient states he is going to go stay with his sister who is a nurse.

## 2020-10-02 NOTE — PROGRESS NOTES
Pharmacy Progress Note     Patient is on Pantoprazole 40mg IV daily. As patient is tolerating other oral medications, has no S/Sx of active GI bleeding, and Hg/Hct is stable, will change to Pantoprazole 40mg PO daily per DPD IV to PO policy. Lab Results   Component Value Date    WBC 10.8 10/02/2020    HGB 12.9 (L) 10/02/2020    HCT 37.8 (L) 10/02/2020    MCV 92.6 10/02/2020     10/02/2020       Patient Selection for IV to PO Conversion   A. Able to tolerate oral / NG medications, diet, or tube feeding   B. Exhibits signs of clinical improvement specific to the drug therapy to be switched   C. Professional judgement of the pharmacist indicates that PO medications are appropriate for the patient   D. Exclusion Criteria  ? NPO  ? Not tolerating feeding (e.g., nausea, vomiting, diarrhea, large residuals on tube feeding)  ? Continuous NG suctioning  ?  Active GI bleed        Please call with questions--  Thanks--  Bi Bloom, PharmD, [address] [facility]  [phone] or [id number] ([facility])   10/2/2020 12:29 PM

## 2020-10-02 NOTE — PROGRESS NOTES
Progress Note    Admit Date: 9/30/2020  Day: 3  Diet: DIET LOW FIBER;    CC: Abdominal and chest pain    Interval history: Patient is resting well today. He reports his pain is at 8/10, but has improved. Is ambulating well, and tolerating clear liquids well. Will be transitioning to soft diet, and PO pain medication today. Denies any fevers, chills, fatigue, shortness of breath or worsening of pain. HPI: Mr. Giovanny Kasper is a 24-yo male with a PMHx of alcohol abuse and alcoholic pancreatitis, presenting with 2-3 days of worsening abdominal and chest pain, rating it 20/10. Pt also has associated N/V, and back pain. Symptoms started after he drank 6-7 drinks, which is his usual weekly amount to drink. Previous episode of alcoholic pancreatitis was 1-year prior. Patient has had decreased appetite, had been vomiting in the AM and last night. Patient denies fevers, diarrheas, recent illness or sick contacts. In ED, labs significant for elevated lipase, leukocytosis, and CT Abdomen c/w acute pancreatitis. Medications:     Scheduled Meds:   sodium chloride flush  10 mL Intravenous 2 times per day    enoxaparin  40 mg Subcutaneous Daily    pantoprazole  40 mg Intravenous Daily     Continuous Infusions:    PRN Meds:polyethylene glycol, sodium chloride flush, acetaminophen **OR** acetaminophen, promethazine **OR** ondansetron    Objective:   Vitals:   T-max:  Patient Vitals for the past 8 hrs:   BP Temp Temp src Pulse Resp SpO2   10/02/20 0841 (!) 141/89 97.4 °F (36.3 °C) Oral 80 16 100 %   10/02/20 0429 (!) 146/78 98.1 °F (36.7 °C) Oral 105 16 98 %       Intake/Output Summary (Last 24 hours) at 10/2/2020 1141  Last data filed at 10/1/2020 1603  Gross per 24 hour   Intake 2240 ml   Output --   Net 2240 ml       Review of Systems   Constitutional: Negative for appetite change, chills, fatigue and fever. HENT: Negative for congestion and sore throat. Eyes: Negative for pain and itching.    Respiratory: Negative for cough, chest tightness and shortness of breath. Cardiovascular: Negative for chest pain, palpitations and leg swelling. Gastrointestinal: Positive for abdominal pain. Negative for abdominal distention, blood in stool, diarrhea, nausea and vomiting. Genitourinary: Negative for difficulty urinating and flank pain. Musculoskeletal: Positive for back pain. Negative for myalgias. Neurological: Negative for tremors, light-headedness and headaches. Psychiatric/Behavioral: Negative for agitation and confusion. Physical Exam  Constitutional:       General: He is not in acute distress. HENT:      Head: Normocephalic and atraumatic. Eyes:      General: No scleral icterus. Conjunctiva/sclera: Conjunctivae normal.   Cardiovascular:      Rate and Rhythm: Normal rate and regular rhythm. Pulses: Normal pulses. Pulmonary:      Effort: Pulmonary effort is normal. No respiratory distress. Abdominal:      General: There is no distension. Tenderness: There is abdominal tenderness. There is no guarding or rebound. Comments: TTP at epigastric region. No rebound or guarding noted. Musculoskeletal:         General: No swelling or tenderness. Skin:     General: Skin is warm and dry. Neurological:      Mental Status: He is alert and oriented to person, place, and time.    Psychiatric:         Mood and Affect: Mood normal.         Behavior: Behavior normal.         LABS:    CBC:   Recent Labs     09/30/20  1338 10/01/20  0635 10/02/20  0718   WBC 14.7* 15.6* 10.8   HGB 15.3 14.0 12.9*   HCT 45.6 42.0 37.8*    249 232   MCV 94.0 93.1 92.6     Renal:    Recent Labs     09/30/20  1338 09/30/20  1740 10/01/20  0635 10/02/20  0718   *  --  133* 133*   K 3.7  --  3.9 3.7   CL 95*  --  96* 95*   CO2 21  --  22 30   BUN 11  --  7 4*   CREATININE 0.8*  --  0.7* 0.7*   GLUCOSE 117*  --  87 133*   CALCIUM 10.0  --  9.4 9.4   MG  --  2.30  --   --    ANIONGAP 18*  --  15 8     Hepatic: Recent Labs     09/30/20  1338   AST 15   ALT 13   BILITOT 0.8   PROT 8.0   LABALBU 4.8   ALKPHOS 67     Troponin: No results for input(s): TROPONINI in the last 72 hours. BNP: No results for input(s): BNP in the last 72 hours. Lipids: No results for input(s): CHOL, HDL in the last 72 hours. Invalid input(s): LDLCALCU, TRIGLYCERIDE  ABGs:  No results for input(s): PHART, VKD6BEU, PO2ART, YQE9VVS, BEART, THGBART, C3ZOUYMI, IIM3LUF in the last 72 hours. INR: No results for input(s): INR in the last 72 hours. Lactate: No results for input(s): LACTATE in the last 72 hours. Cultures:  -----------------------------------------------------------------  RAD:   CT ABDOMEN PELVIS W IV CONTRAST Additional Contrast? None   Final Result      Acute interstitial pancreatitis without evidence of complication. XR CHEST (2 VW)   Final Result      No consolidation          Assessment/Plan:   Mr. Rowan Hawkins is a 24-yo man w/ PMHx of alcohol abuse and alcoholic pancreatitis presenting with abdominal, back and chest pain.      Epigastric Pain 2/2 Acute Pancreatitis d/t Alcohol Use  - Elevated lipase at 518, leukocytosis at 14.7 on admission - current WBC at 10.8  - CT Abdomen: Acute interstitial pancreatitis   - Improved epigastric pain, Hemodynamically stable.    [ ] D/c IV Fluids   [ ] Start on Low-fiber diet  [ ] IV Protonix   [ ] D/c IV dilaudid, continue on PO Per     Alcohol, Marijuana Abuse  - U-Tox (+): cannabis, and opiates  - Counseled regarding substance abuse, alcohol cessation    Code Status: Full code  FEN: Low fiber  PPX: Protonix, Lovenox  DISPO: ASHLEY    Leo Martinez, MS4  10/02/20  11:41 AM    This patient has been staffed and discussed with Theresa Braden MD.

## 2022-11-30 ENCOUNTER — HOSPITAL ENCOUNTER (EMERGENCY)
Age: 27
Discharge: HOME | End: 2022-12-01
Attending: EMERGENCY MEDICINE
Payer: MEDICARE

## 2022-11-30 ENCOUNTER — APPOINTMENT (OUTPATIENT)
Dept: GENERAL RADIOLOGY | Age: 27
End: 2022-11-30
Payer: MEDICARE

## 2022-11-30 VITALS
RESPIRATION RATE: 16 BRPM | SYSTOLIC BLOOD PRESSURE: 123 MMHG | HEART RATE: 91 BPM | TEMPERATURE: 99.3 F | BODY MASS INDEX: 22.87 KG/M2 | DIASTOLIC BLOOD PRESSURE: 84 MMHG | HEIGHT: 75 IN | OXYGEN SATURATION: 100 %

## 2022-11-30 DIAGNOSIS — S52.572A OTHER CLOSED INTRA-ARTICULAR FRACTURE OF DISTAL END OF LEFT RADIUS, INITIAL ENCOUNTER: Primary | ICD-10-CM

## 2022-11-30 DIAGNOSIS — S82.64XA CLOSED NONDISPLACED FRACTURE OF LATERAL MALLEOLUS OF RIGHT FIBULA, INITIAL ENCOUNTER: ICD-10-CM

## 2022-11-30 PROCEDURE — 6370000000 HC RX 637 (ALT 250 FOR IP): Performed by: STUDENT IN AN ORGANIZED HEALTH CARE EDUCATION/TRAINING PROGRAM

## 2022-11-30 PROCEDURE — 73630 X-RAY EXAM OF FOOT: CPT

## 2022-11-30 PROCEDURE — 99284 EMERGENCY DEPT VISIT MOD MDM: CPT

## 2022-11-30 PROCEDURE — 73080 X-RAY EXAM OF ELBOW: CPT

## 2022-11-30 PROCEDURE — 73110 X-RAY EXAM OF WRIST: CPT

## 2022-11-30 PROCEDURE — 73090 X-RAY EXAM OF FOREARM: CPT

## 2022-11-30 PROCEDURE — 73610 X-RAY EXAM OF ANKLE: CPT

## 2022-11-30 PROCEDURE — 6360000002 HC RX W HCPCS: Performed by: PHYSICIAN ASSISTANT

## 2022-11-30 PROCEDURE — 96372 THER/PROPH/DIAG INJ SC/IM: CPT

## 2022-11-30 RX ORDER — KETOROLAC TROMETHAMINE 30 MG/ML
15 INJECTION, SOLUTION INTRAMUSCULAR; INTRAVENOUS ONCE
Status: COMPLETED | OUTPATIENT
Start: 2022-11-30 | End: 2022-11-30

## 2022-11-30 RX ORDER — OXYCODONE HYDROCHLORIDE AND ACETAMINOPHEN 5; 325 MG/1; MG/1
1 TABLET ORAL ONCE
Status: COMPLETED | OUTPATIENT
Start: 2022-11-30 | End: 2022-11-30

## 2022-11-30 RX ADMIN — KETOROLAC TROMETHAMINE 15 MG: 30 INJECTION, SOLUTION INTRAMUSCULAR; INTRAVENOUS at 21:23

## 2022-11-30 RX ADMIN — OXYCODONE HYDROCHLORIDE AND ACETAMINOPHEN 1 TABLET: 5; 325 TABLET ORAL at 22:43

## 2022-11-30 ASSESSMENT — PAIN SCALES - GENERAL
PAINLEVEL_OUTOF10: 10
PAINLEVEL_OUTOF10: 10

## 2022-11-30 ASSESSMENT — PAIN DESCRIPTION - ORIENTATION: ORIENTATION: RIGHT;LEFT

## 2022-11-30 ASSESSMENT — PAIN DESCRIPTION - ONSET: ONSET: ON-GOING

## 2022-11-30 ASSESSMENT — PAIN DESCRIPTION - PAIN TYPE: TYPE: ACUTE PAIN

## 2022-11-30 ASSESSMENT — PAIN - FUNCTIONAL ASSESSMENT
PAIN_FUNCTIONAL_ASSESSMENT: PREVENTS OR INTERFERES SOME ACTIVE ACTIVITIES AND ADLS
PAIN_FUNCTIONAL_ASSESSMENT: 0-10

## 2022-11-30 ASSESSMENT — PAIN DESCRIPTION - DESCRIPTORS: DESCRIPTORS: DISCOMFORT

## 2022-11-30 ASSESSMENT — PAIN DESCRIPTION - LOCATION: LOCATION: FOOT;ARM

## 2022-11-30 ASSESSMENT — PAIN DESCRIPTION - FREQUENCY: FREQUENCY: CONTINUOUS

## 2022-12-01 RX ORDER — METHOCARBAMOL 500 MG/1
500 TABLET, FILM COATED ORAL 4 TIMES DAILY
Qty: 40 TABLET | Refills: 0 | Status: SHIPPED | OUTPATIENT
Start: 2022-12-01 | End: 2022-12-11

## 2022-12-01 RX ORDER — IBUPROFEN 600 MG/1
600 TABLET ORAL 4 TIMES DAILY PRN
Qty: 40 TABLET | Refills: 0 | Status: SHIPPED | OUTPATIENT
Start: 2022-12-01

## 2022-12-01 RX ORDER — OXYCODONE HYDROCHLORIDE 5 MG/1
5 TABLET ORAL EVERY 6 HOURS PRN
Qty: 10 TABLET | Refills: 0 | Status: SHIPPED | OUTPATIENT
Start: 2022-12-01 | End: 2022-12-04

## 2022-12-01 NOTE — ED PROVIDER NOTES
ED Attending Attestation Note     Date of evaluation: 11/30/2022    This patient was seen by the resident. I have seen and examined the patient, agree with the workup, evaluation, management and diagnosis. The care plan has been discussed. 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 He has tenderness and swelling over the right lateral malleolus, and diffusely over the wrist, but has good distal pulses and capillary refill.         Tanna De Santiago MD  12/01/22 1243

## 2022-12-01 NOTE — ED PROVIDER NOTES
[id number] [facility] RESIDENT NOTE       Date of evaluation: 11/30/2022    Chief Complaint     Leg Pain and Hand Pain (Pt came into the ED after a fall from standing height yesterday and now has a swollen left hand and pain in his right foot.)      of Present Illness     Benedict Littlejohn is a 32 y.o. male with unremarkable past medical history who presents to the emergency department with left wrist and right ankle pain. Patient was carrying an HVAC when he slid on some rocks and fell onto an outstretched left hand and twisted his right ankle. He has been unable able to bear weight on the right ankle since this occurred. It occurred approximately 24 hours prior to presentation. Denies associated head trauma. No chest pain, difficulty breathing, abdominal pain, vomiting. Review of Systems     Please see HPI for pertinent positives and negatives. All other systems reviewed and negative. Past Medical, Surgical, Family, and Social History     He has a past medical history of Pancreatitis. He has no past surgical history on file. His family history is not on file. He reports that he has never smoked. He has never used smokeless tobacco. He reports current alcohol use. He reports current drug use. Drug: Marijuana Shellye Burkitt). Medications     Discharge Medication List as of 12/1/2022  1:24 AM        CONTINUE these medications which have NOT CHANGED    Details   pantoprazole (PROTONIX) 40 MG tablet Take 1 tablet by mouth every morning (before breakfast), Disp-30 tablet, R-1Print             Allergies     He has No Known Allergies. Physical Exam     INITIAL VITALS: BP: 123/84, Temp: 99.3 °F (37.4 °C), Heart Rate: 91, Resp: 16, SpO2: 100 %   Physical Exam  Constitutional:       General: He is not in acute distress. Appearance: He is not toxic-appearing. HENT:      Head: Normocephalic and atraumatic.       Mouth/Throat:      Mouth: Mucous membranes are moist. Eyes:      Extraocular Movements: Extraocular movements intact. Pupils: Pupils are equal, round, and reactive to light. Cardiovascular:      Rate and Rhythm: Normal rate and regular rhythm. Pulses: Normal pulses. Pulmonary:      Effort: Pulmonary effort is normal.      Breath sounds: Normal breath sounds. Chest:      Chest wall: No tenderness. Abdominal:      General: There is no distension. Palpations: Abdomen is soft. Tenderness: There is no abdominal tenderness. There is no guarding. Musculoskeletal:      Cervical back: Neck supple. No tenderness. Comments: Swelling and deformity with tenderness about the left wrist, most pronounced at the distal radius. Strong radial pulse. Cap refill less than 2 seconds. AIN-PIN-IO intact. Full range of motion of the hand limited secondary to pain. Sensation intact to light touch in the median, radial, ulnar distributions. Swelling of the right ankle with tenderness most pronounced over the right lateral malleolus. Diffuse foot tenderness to palpation. No tenderness at the Lisfranc area. Strong DP and PT pulses. Cap refill less than 2 seconds. Able to wiggle toes. Range of motion limited in the ankle secondary to pain. Sensation intact to light touch in the foot. No proximal fibular tenderness to palpation. Neurological:      Mental Status: He is alert. DiagnosticResults     EKG   None performed. RADIOLOGY:  XR FOOT RIGHT (MIN 3 VIEWS)   Final Result      1. No findings for acute bony or soft tissue abnormality within the left elbow. 2 VIEWS OF THE LEFT FOREARM      HISTORY: Fall. FINDINGS: There is comminuted intra-articular fracture of the distal radius extending to the radiocarpal joint. Remainder of the radius and ulna otherwise unremarkable. IMPRESSION:      1. Comminuted nondisplaced intra-articular fracture distal radius.             3 VIEWS OF THE LEFT WRIST      FINDINGS: There is comminuted nondisplaced intra-articular fracture distal radius. Radiocarpal joint otherwise intact. Distal ulna is well maintained. Carpal carpal joints are intact. Diffuse soft tissue swelling of the wrist.      IMPRESSION:      1. Comminuted nondisplaced intra-articular fracture distal radius with associated prominent soft tissue swelling. 3 VIEWS OF THE RIGHT ANKLE      HISTORY: Fall      FINDINGS: There is nondisplaced oblique fracture through the inferior tip of the lateral malleolus appearing ankle mortise is intact. Remaining bones of the mid--are maintained. The orthopedic screw extends through the subtalar joint. Prominent soft    tissue swelling over the lateral malleolus. IMPRESSION:      1. Nondisplaced oblique fracture distal fibula. 3 VIEWS OF THE RIGHT FOOT      HISTORY: Fall with pain      FINDINGS: Post surgical changes with orthopedic screws overlie the first metatarsal and first proximal phalanx. There is no acute traumatic bony anatomy identified. Screw extending through the subtalar joint is noted. No soft tissue abnormality seen. IMPRESSION:      1. No findings for acute traumatic abnormality within the right foot. XR ANKLE RIGHT (MIN 3 VIEWS)   Final Result      1. No findings for acute bony or soft tissue abnormality within the left elbow. 2 VIEWS OF THE LEFT FOREARM      HISTORY: Fall. FINDINGS: There is comminuted intra-articular fracture of the distal radius extending to the radiocarpal joint. Remainder of the radius and ulna otherwise unremarkable. IMPRESSION:      1. Comminuted nondisplaced intra-articular fracture distal radius. 3 VIEWS OF THE LEFT WRIST      FINDINGS: There is comminuted nondisplaced intra-articular fracture distal radius. Radiocarpal joint otherwise intact. Distal ulna is well maintained. Carpal carpal joints are intact.   Diffuse soft tissue swelling of the wrist.      IMPRESSION: 1.  Comminuted nondisplaced intra-articular fracture distal radius with associated prominent soft tissue swelling. 3 VIEWS OF THE RIGHT ANKLE      HISTORY: Fall      FINDINGS: There is nondisplaced oblique fracture through the inferior tip of the lateral malleolus appearing ankle mortise is intact. Remaining bones of the mid--are maintained. The orthopedic screw extends through the subtalar joint. Prominent soft    tissue swelling over the lateral malleolus. IMPRESSION:      1. Nondisplaced oblique fracture distal fibula. 3 VIEWS OF THE RIGHT FOOT      HISTORY: Fall with pain      FINDINGS: Post surgical changes with orthopedic screws overlie the first metatarsal and first proximal phalanx. There is no acute traumatic bony anatomy identified. Screw extending through the subtalar joint is noted. No soft tissue abnormality seen. IMPRESSION:      1. No findings for acute traumatic abnormality within the right foot. XR WRIST LEFT (MIN 3 VIEWS)   Final Result      1. No findings for acute bony or soft tissue abnormality within the left elbow. 2 VIEWS OF THE LEFT FOREARM      HISTORY: Fall. FINDINGS: There is comminuted intra-articular fracture of the distal radius extending to the radiocarpal joint. Remainder of the radius and ulna otherwise unremarkable. IMPRESSION:      1. Comminuted nondisplaced intra-articular fracture distal radius. 3 VIEWS OF THE LEFT WRIST      FINDINGS: There is comminuted nondisplaced intra-articular fracture distal radius. Radiocarpal joint otherwise intact. Distal ulna is well maintained. Carpal carpal joints are intact. Diffuse soft tissue swelling of the wrist.      IMPRESSION:      1. Comminuted nondisplaced intra-articular fracture distal radius with associated prominent soft tissue swelling.             3 VIEWS OF THE RIGHT ANKLE      HISTORY: Fall      FINDINGS: There is nondisplaced oblique fracture through the inferior tip of the lateral malleolus appearing ankle mortise is intact. Remaining bones of the mid--are maintained. The orthopedic screw extends through the subtalar joint. Prominent soft    tissue swelling over the lateral malleolus. IMPRESSION:      1. Nondisplaced oblique fracture distal fibula. 3 VIEWS OF THE RIGHT FOOT      HISTORY: Fall with pain      FINDINGS: Post surgical changes with orthopedic screws overlie the first metatarsal and first proximal phalanx. There is no acute traumatic bony anatomy identified. Screw extending through the subtalar joint is noted. No soft tissue abnormality seen. IMPRESSION:      1. No findings for acute traumatic abnormality within the right foot. XR RADIUS ULNA LEFT (2 VIEWS)   Final Result      1. No findings for acute bony or soft tissue abnormality within the left elbow. 2 VIEWS OF THE LEFT FOREARM      HISTORY: Fall. FINDINGS: There is comminuted intra-articular fracture of the distal radius extending to the radiocarpal joint. Remainder of the radius and ulna otherwise unremarkable. IMPRESSION:      1. Comminuted nondisplaced intra-articular fracture distal radius. 3 VIEWS OF THE LEFT WRIST      FINDINGS: There is comminuted nondisplaced intra-articular fracture distal radius. Radiocarpal joint otherwise intact. Distal ulna is well maintained. Carpal carpal joints are intact. Diffuse soft tissue swelling of the wrist.      IMPRESSION:      1. Comminuted nondisplaced intra-articular fracture distal radius with associated prominent soft tissue swelling. 3 VIEWS OF THE RIGHT ANKLE      HISTORY: Fall      FINDINGS: There is nondisplaced oblique fracture through the inferior tip of the lateral malleolus appearing ankle mortise is intact. Remaining bones of the mid--are maintained. The orthopedic screw extends through the subtalar joint.   Prominent soft    tissue swelling over the lateral malleolus. IMPRESSION:      1. Nondisplaced oblique fracture distal fibula. 3 VIEWS OF THE RIGHT FOOT      HISTORY: Fall with pain      FINDINGS: Post surgical changes with orthopedic screws overlie the first metatarsal and first proximal phalanx. There is no acute traumatic bony anatomy identified. Screw extending through the subtalar joint is noted. No soft tissue abnormality seen. IMPRESSION:      1. No findings for acute traumatic abnormality within the right foot. XR ELBOW LEFT (MIN 3 VIEWS)   Final Result      1. No findings for acute bony or soft tissue abnormality within the left elbow. 2 VIEWS OF THE LEFT FOREARM      HISTORY: Fall. FINDINGS: There is comminuted intra-articular fracture of the distal radius extending to the radiocarpal joint. Remainder of the radius and ulna otherwise unremarkable. IMPRESSION:      1. Comminuted nondisplaced intra-articular fracture distal radius. 3 VIEWS OF THE LEFT WRIST      FINDINGS: There is comminuted nondisplaced intra-articular fracture distal radius. Radiocarpal joint otherwise intact. Distal ulna is well maintained. Carpal carpal joints are intact. Diffuse soft tissue swelling of the wrist.      IMPRESSION:      1. Comminuted nondisplaced intra-articular fracture distal radius with associated prominent soft tissue swelling. 3 VIEWS OF THE RIGHT ANKLE      HISTORY: Fall      FINDINGS: There is nondisplaced oblique fracture through the inferior tip of the lateral malleolus appearing ankle mortise is intact. Remaining bones of the mid--are maintained. The orthopedic screw extends through the subtalar joint. Prominent soft    tissue swelling over the lateral malleolus. IMPRESSION:      1. Nondisplaced oblique fracture distal fibula.                3 VIEWS OF THE RIGHT FOOT      HISTORY: Fall with pain      FINDINGS: Post surgical changes with orthopedic screws overlie the first metatarsal and first proximal phalanx. There is no acute traumatic bony anatomy identified. Screw extending through the subtalar joint is noted. No soft tissue abnormality seen. IMPRESSION:      1. No findings for acute traumatic abnormality within the right foot. LABS:   No results found for this visit on 11/30/22. ED BEDSIDE ULTRASOUND:  No results found. RECENT VITALS:  BP: 123/84, Temp: 99.3 °F (37.4 °C), Heart Rate: 91,Resp: 16, SpO2: 100 %     Procedures     None    ED Course     Nursing Notes, Past Medical Hx, Past Surgical Hx, Social Hx, Allergies, and Family Hx were reviewed. The patient was given the followingmedications:  Orders Placed This Encounter   Medications    ketorolac (TORADOL) injection 15 mg    oxyCODONE-acetaminophen (PERCOCET) 5-325 MG per tablet 1 tablet    oxyCODONE (ROXICODONE) 5 MG immediate release tablet     Sig: Take 1 tablet by mouth every 6 hours as needed for Pain for up to 3 days. WARNING:  May cause drowsiness. May impair ability to operate vehicles or machinery. Do not use in combination with alcohol. Dispense:  10 tablet     Refill:  0    methocarbamol (ROBAXIN) 500 MG tablet     Sig: Take 1 tablet by mouth 4 times daily for 10 days     Dispense:  40 tablet     Refill:  0    ibuprofen (ADVIL;MOTRIN) 600 MG tablet     Sig: Take 1 tablet by mouth 4 times daily as needed for Pain     Dispense:  40 tablet     Refill:  0       CONSULTS:  None    MEDICAL DECISION MAKING / ASSESSMENT / Bharti Joo is a 32 y.o. male who presents to the emergency department with left wrist and right ankle pain after a fall. He is neurovascularly intact distal to the injury. X-rays are obtained which demonstrates comminuted intra-articular fracture of the distal radius which is nondisplaced. Sugar-tong splint will be placed. X-ray of the ankle demonstrates Veronica a lateral malleolus fracture for which walking boot will be placed.   Patient is able to ambulate with 1 crutch with the splint and boot in place. He is reassessed after splint placement and remains neurovascularly intact. Patient will follow up with orthopedics in the outpatient setting for further evaluation and is provided with pain medications to treat his multiple fractures in the outpatient setting. He is discharged in stable condition. This patient was also evaluated by the attending physician. All care plans werediscussed and agreed upon. Clinical Impression     1. Other closed intra-articular fracture of distal end of left radius, initial encounter    2. Closed nondisplaced fracture of lateral malleolus of right fibula, initial encounter        Disposition     PATIENT REFERRED TO:  Jermaine Fernandez [address]  [address] [address] [phone]  Schedule an appointment as soon as possible for a visit       DISCHARGE MEDICATIONS:  Discharge Medication List as of 12/1/2022  1:24 AM        START taking these medications    Details   oxyCODONE (ROXICODONE) 5 MG immediate release tablet Take 1 tablet by mouth every 6 hours as needed for Pain for up to 3 days. WARNING:  May cause drowsiness. May impair ability to operate vehicles or machinery.   Do not use in combination with alcohol., Disp-10 tablet, R-0Normal      methocarbamol (ROBAXIN) 500 MG tablet Take 1 tablet by mouth 4 times daily for 10 days, Disp-40 tablet, R-0Normal      ibuprofen (ADVIL;MOTRIN) 600 MG tablet Take 1 tablet by mouth 4 times daily as needed for Pain, Disp-40 tablet, R-0Normal             DISPOSITION Decision To Discharge 12/01/2022 01:24:48 AM       Norris Gallardo MD  Resident  12/01/22 [id number]

## 2022-12-01 NOTE — ED TRIAGE NOTES
[facility] Emergency Department  MEDICAL SCREENING EXAM    Date of Service: 11/30/2022    Reason for Visit: Leg Pain and Hand Pain (Pt came into the ED after a fall from standing height yesterday and now has a swollen left hand and pain in his right foot.)    Patient History, Brief Exam, and Initial Assessment     Abbreviated HPI: Patricia Diego is a 32 y.o. male presenting with injuries from a fall. The patient was carrying a heavy A/C unit , twisted his right ankle and fell backward landing on his left arm with [address] injury. Pain and swelling of the right foot/ankle and left wrist/forearm. Pain 10/10. INITIAL VITALS: BP: 123/84, Temp: 99.3 °F (37.4 °C), Heart Rate: 91, Resp: 16, SpO2: 100 %    Plan     Patient was evaluated in the REU for a medical screening exam.  To further evaluate the presenting complaints, the following orders have been placed: XR: R foot, R ankle, L wrist, L rad/ulna, left elbow. See primary provider's note for full details and final disposition. Current Facility-Administered Medications:   Orders Placed This Encounter   Medications    ketorolac (TORADOL) injection 15 mg         REU Dispo     Stable for lobby while awaiting ED bed    Relevant Medical History     Past Medical History:   Diagnosis Date    Pancreatitis      No past surgical history on file.   No Known Allergies